# Patient Record
Sex: FEMALE | Race: WHITE | HISPANIC OR LATINO | Employment: FULL TIME | URBAN - METROPOLITAN AREA
[De-identification: names, ages, dates, MRNs, and addresses within clinical notes are randomized per-mention and may not be internally consistent; named-entity substitution may affect disease eponyms.]

---

## 2017-02-24 ENCOUNTER — HOSPITAL ENCOUNTER (OUTPATIENT)
Dept: LAB | Facility: MEDICAL CENTER | Age: 42
End: 2017-02-24
Attending: NURSE PRACTITIONER
Payer: COMMERCIAL

## 2017-02-24 ENCOUNTER — OFFICE VISIT (OUTPATIENT)
Dept: MEDICAL GROUP | Facility: PHYSICIAN GROUP | Age: 42
End: 2017-02-24
Payer: COMMERCIAL

## 2017-02-24 VITALS
HEART RATE: 70 BPM | DIASTOLIC BLOOD PRESSURE: 78 MMHG | WEIGHT: 153 LBS | HEIGHT: 62 IN | TEMPERATURE: 97.2 F | OXYGEN SATURATION: 98 % | RESPIRATION RATE: 14 BRPM | BODY MASS INDEX: 28.16 KG/M2 | SYSTOLIC BLOOD PRESSURE: 102 MMHG

## 2017-02-24 DIAGNOSIS — Z00.00 PREVENTATIVE HEALTH CARE: ICD-10-CM

## 2017-02-24 DIAGNOSIS — Z12.39 SCREENING FOR BREAST CANCER: ICD-10-CM

## 2017-02-24 DIAGNOSIS — E07.89 THYROID FULLNESS: ICD-10-CM

## 2017-02-24 LAB
25(OH)D3 SERPL-MCNC: 10 NG/ML (ref 30–100)
ALBUMIN SERPL BCP-MCNC: 3.7 G/DL (ref 3.2–4.9)
ALBUMIN/GLOB SERPL: 1 G/DL
ALP SERPL-CCNC: 61 U/L (ref 30–99)
ALT SERPL-CCNC: 45 U/L (ref 2–50)
ANION GAP SERPL CALC-SCNC: 5 MMOL/L (ref 0–11.9)
AST SERPL-CCNC: 39 U/L (ref 12–45)
BILIRUB SERPL-MCNC: 0.4 MG/DL (ref 0.1–1.5)
BUN SERPL-MCNC: 14 MG/DL (ref 8–22)
CALCIUM SERPL-MCNC: 9.1 MG/DL (ref 8.5–10.5)
CHLORIDE SERPL-SCNC: 108 MMOL/L (ref 96–112)
CHOLEST SERPL-MCNC: 109 MG/DL (ref 100–199)
CO2 SERPL-SCNC: 26 MMOL/L (ref 20–33)
CREAT SERPL-MCNC: 0.64 MG/DL (ref 0.5–1.4)
GLOBULIN SER CALC-MCNC: 3.7 G/DL (ref 1.9–3.5)
GLUCOSE SERPL-MCNC: 101 MG/DL (ref 65–99)
HDLC SERPL-MCNC: 36 MG/DL
LDLC SERPL CALC-MCNC: 58 MG/DL
POTASSIUM SERPL-SCNC: 4.2 MMOL/L (ref 3.6–5.5)
PROT SERPL-MCNC: 7.4 G/DL (ref 6–8.2)
SODIUM SERPL-SCNC: 139 MMOL/L (ref 135–145)
TRIGL SERPL-MCNC: 74 MG/DL (ref 0–149)
TSH SERPL DL<=0.005 MIU/L-ACNC: 2.04 UIU/ML (ref 0.3–3.7)

## 2017-02-24 PROCEDURE — 82306 VITAMIN D 25 HYDROXY: CPT

## 2017-02-24 PROCEDURE — 84443 ASSAY THYROID STIM HORMONE: CPT

## 2017-02-24 PROCEDURE — 80061 LIPID PANEL: CPT

## 2017-02-24 PROCEDURE — 99214 OFFICE O/P EST MOD 30 MIN: CPT | Performed by: NURSE PRACTITIONER

## 2017-02-24 PROCEDURE — 36415 COLL VENOUS BLD VENIPUNCTURE: CPT

## 2017-02-24 PROCEDURE — 80053 COMPREHEN METABOLIC PANEL: CPT

## 2017-02-24 ASSESSMENT — PATIENT HEALTH QUESTIONNAIRE - PHQ9: CLINICAL INTERPRETATION OF PHQ2 SCORE: 0

## 2017-02-24 NOTE — MR AVS SNAPSHOT
"        Alis Perdomo   2017 7:20 AM   Office Visit   MRN: [id number]    Department:  [facility]   Dept Phone:  [phone]    Description:  Female : 1975   Provider:  LESLIE Desai           Reason for Visit     Establish Care     Lump neck area      Allergies as of 2017     No Known Allergies      You were diagnosed with     Screening for breast cancer   [359116]       Thyroid fullness   [534112]       Preventative health care   [[id number]]         Vital Signs     Blood Pressure Pulse Temperature Respirations Height Weight    102/78 mmHg 70 36.2 °C (97.2 °F) 14 1.575 m (5' 2.01\") 69.4 kg (153 lb)    Body Mass Index Oxygen Saturation Last Menstrual Period Breastfeeding? Smoking Status       27.98 kg/m2 98% 2017 No Never Smoker        Basic Information     Date Of Birth Sex Race Ethnicity Preferred Language    1975 Female  or   Origin (Mauritanian,Citizen of [place], etc) English      Problem List              ICD-10-CM Priority Class Noted - Resolved    Thyroid fullness E07.89   2017 - Present      Health Maintenance        Date Due Completion Dates    IMM DTaP/Tdap/Td Vaccine (1 - Tdap) 1993 ---    MAMMOGRAM 2016    IMM INFLUENZA (1) 2016 ---    PAP SMEAR 7/15/2018 7/15/2015, 2013 (Done)    Override on 2013: Done            Current Immunizations     No immunizations on file.      Below and/or attached are the medications your provider expects you to take. Review all of your home medications and newly ordered medications with your provider and/or pharmacist. Follow medication instructions as directed by your provider and/or pharmacist. Please keep your medication list with you and share with your provider. Update the information when medications are discontinued, doses are changed, or new medications (including over-the-counter products) are added; and carry medication information at all times in " the event of emergency situations     Allergies:  No Known Allergies          Medications  Valid as of: February 24, 2017 -  7:56 AM    Generic Name Brand Name Tablet Size Instructions for use    .                 Medicines prescribed today were sent to:     [facility] [address] [place] [address] [address] [facility]    [address] [facility] [address]    Phone: [phone] Fax: [phone]    Open 24 Hours?: No      Medication refill instructions:       If your prescription bottle indicates you have medication refills left, it is not necessary to call your provider’s office. Please contact your pharmacy and they will refill your medication.    If your prescription bottle indicates you do not have any refills left, you may request refills at any time through one of the following ways: The online [employer] system (except Urgent Care), by calling your provider’s office, or by asking your pharmacy to contact your provider’s office with a refill request. Medication refills are processed only during regular business hours and may not be available until the next business day. Your provider may request additional information or to have a follow-up visit with you prior to refilling your medication.   *Please Note: Medication refills are assigned a new Rx number when refilled electronically. Your pharmacy may indicate that no refills were authorized even though a new prescription for the same medication is available at the pharmacy. Please request the medicine by name with the pharmacy before contacting your provider for a refill.        Your To Do List     Future Labs/Procedures Complete By Expires    COMP METABOLIC PANEL  As directed 2/25/2018    LIPID PROFILE  As directed 2/25/2018    MA-SCREEN MAMMO W/CAD-BILAT  As directed 3/28/2018    TSH WITH REFLEX TO FT4  As directed 2/24/2018    US-SOFT TISSUES OF HEAD - NECK  As directed 8/27/2017    VITAMIN D,25 HYDROXY  As directed 2/25/2018         [employer] Access Code:  [email or web address]  Expires: 3/2/2017 12:09 PM    [employer]  A secure, online tool to manage your health information     [employer] Home[employer]® is a secure, online tool that connects you to your personalized health information from the privacy of your home -- day or night - making it very easy for you to manage your healthcare. Once the activation process is completed, you can even access your medical information using the Home[employer] Home[employer] shabbir, which is available for free in the Apple Shabbir store or Google Play store.     [employer] provides the following levels of access (as shown below):   My Chart Features   [facility] Primary Care Doctor [facility]  Specialists [facility]  Urgent  Care Non-[facility]  Primary Care  Doctor   Email your healthcare team securely and privately 24/7 X X X    Manage appointments: schedule your next appointment; view details of past/upcoming appointments X      Request prescription refills. X      View recent personal medical records, including lab and immunizations X X X X   View health record, including health history, allergies, medications X X X X   Read reports about your outpatient visits, procedures, consult and ER notes X X X X   See your discharge summary, which is a recap of your hospital and/or ER visit that includes your diagnosis, lab results, and care plan. X X       How to register for [employer]:  1. Go to  [email or web address].  2. Click on the Sign Up Now box, which takes you to the New Member Sign Up page. You will need to provide the following information:  a. Enter your [employer] Access Code exactly as it appears at the top of this page. (You will not need to use this code after you’ve completed the sign-up process. If you do not sign up before the expiration date, you must request a new code.)   b. Enter your date of birth.   c. Enter your home email address.   d. Click Submit, and follow the next screen’s instructions.  3. Create a Home[employer] Home[employer] ID. This will be your Home[employer] Home[employer] login ID and cannot be  changed, so think of one that is secure and easy to remember.  4. Create a [employer] password. You can change your password at any time.  5. Enter your Password Reset Question and Answer. This can be used at a later time if you forget your password.   6. Enter your e-mail address. This allows you to receive e-mail notifications when new information is available in [employer].  7. Click Sign Up. You can now view your health information.    For assistance activating your [employer] account, call [phone]

## 2017-02-24 NOTE — ASSESSMENT & PLAN NOTE
Patient states that she has noticed a fullness in her right neck.  No pain, some difficulty swallowing.  No fever, chills, recent sore throat, cold.

## 2017-02-24 NOTE — PROGRESS NOTES
"Chief Complaint   Patient presents with   • Establish Care   • Lump     neck area       Subjective:   Alis Perdomo is a 41 y.o. female here today to establish care and for evaluation and management of:    Thyroid fullness  Patient states that she has noticed a fullness in her right neck.  No pain, some difficulty swallowing.  No fever, chills, recent sore throat, cold.         Current medicines (including changes today)  No current outpatient prescriptions on file.     No current facility-administered medications for this visit.     She  has no past medical history of Asthma, Hypertension, or Hyperlipidemia.    ROS + thyroid fullness -fever, chills, sorethroat.  No chest pain, no shortness of breath, no abdominal pain       Objective:     Blood pressure 102/78, pulse 70, temperature 36.2 °C (97.2 °F), resp. rate 14, height 1.575 m (5' 2.01\"), weight 69.4 kg (153 lb), last menstrual period 02/19/2017, SpO2 98 %, not currently breastfeeding. Body mass index is 27.98 kg/(m^2).   Physical Exam:  Constitutional: Alert, no distress.  Skin: Warm, dry, good turgor,no cyanosis, no rashes in visible areas.  Eye: Equal, round and reactive, conjunctiva clear, lids normal.  Ears: No tenderness, no discharge.  External canals are without any significant edema or erythema.  Tympanic membranes are without any inflammation, no effusion.  Gross auditory acuity is intact.  Nose: symmetrical without tenderness, no discharge.  Mouth/Throat: lips without lesion.  Oropharynx clear.  Throat without erythema, exudates or tonsillar enlargement.  Neck: Trachea midline,right fullness noted on thyroid.  No nodules palpable. No cervical or supraclavicular lymphadenopathy. Range of motion within normal limits.  Neuro: Cranial nerves 2-12 grossly intact.  No sensory deficit.  Respiratory: Unlabored respiratory effort, lungs clear to auscultation, no wheezes, no ronchi.  Cardiovascular: Normal S1, S2, no murmur, no " edema.  Abdomen: Soft, non-tender, no masses, no guarding,  no hepatosplenomegaly.  Psych: Alert and oriented x3, normal affect and mood and judgement.        Assessment and Plan:   The following treatment plan was discussed    1. Thyroid fullness  This is a new problem to me.  Acute.  Will check TSH and thyroid ultrasound.  Monitor and follow results.  - COMP METABOLIC PANEL; Future  - LIPID PROFILE; Future  - TSH WITH REFLEX TO FT4; Future  - US-SOFT TISSUES OF HEAD - NECK; Future    2. Preventative health care  Discussed diet, exercise and adding calcium to her daily routine.  Will check vitamin D levels.    - VITAMIN D,25 HYDROXY; Future    3. Screening for breast cancer  Overdue for screening.  No breast changes reported at this time.  Past mammograms have been normal.  Order placed.  Will monitor results.  - MA-SCREEN MAMMO W/CAD-BILAT; Future      Followup: Return in about 2 months (around 4/24/2017), or if symptoms worsen or fail to improve, for well woman.

## 2017-02-27 ENCOUNTER — TELEPHONE (OUTPATIENT)
Dept: MEDICAL GROUP | Facility: PHYSICIAN GROUP | Age: 42
End: 2017-02-27

## 2017-02-27 NOTE — TELEPHONE ENCOUNTER
"----- Message from LESLIE Desai sent at 2/27/2017  8:00 AM PST -----  Please notify patient, her vitamin D level came back low at 10.  The normal level is above 30.  Vitamin D is an important vitamin that helps with energy, bone strength and our immune system.  It also helps our body utilize glucose more efficiently.  I would like her to add 5000 units of vitamin D3 daily.  We will recheck your levels in 12 months.    All other lab results fell within the normal range, except the good cholesterol was just a little low.  Increasing cardiovascular exercise like brisk walking 20 minutes/day can help bring that number up.  The \"good\" cholesterol helps protect our heart from disease.  Please call with any questions.  ANGELICA Desai.  "

## 2017-07-06 ENCOUNTER — HOSPITAL ENCOUNTER (OUTPATIENT)
Dept: RADIOLOGY | Facility: MEDICAL CENTER | Age: 42
End: 2017-07-06
Attending: NURSE PRACTITIONER
Payer: COMMERCIAL

## 2017-07-06 DIAGNOSIS — E07.89 THYROID FULLNESS: ICD-10-CM

## 2017-07-06 PROCEDURE — 76536 US EXAM OF HEAD AND NECK: CPT

## 2017-07-10 ENCOUNTER — TELEPHONE (OUTPATIENT)
Dept: MEDICAL GROUP | Facility: PHYSICIAN GROUP | Age: 42
End: 2017-07-10

## 2017-07-10 NOTE — TELEPHONE ENCOUNTER
----- Message from LESLIE Desai sent at 7/10/2017  7:34 AM PDT -----  Please let Ratna know that her thyroid ultrasound did not show any abnormalities or nodules.  All good news.  LESLIE Desai

## 2017-09-20 ENCOUNTER — OFFICE VISIT (OUTPATIENT)
Dept: MEDICAL GROUP | Facility: PHYSICIAN GROUP | Age: 42
End: 2017-09-20
Payer: COMMERCIAL

## 2017-09-20 VITALS
WEIGHT: 155 LBS | TEMPERATURE: 98.1 F | OXYGEN SATURATION: 100 % | HEART RATE: 79 BPM | RESPIRATION RATE: 12 BRPM | DIASTOLIC BLOOD PRESSURE: 76 MMHG | HEIGHT: 62 IN | SYSTOLIC BLOOD PRESSURE: 118 MMHG | BODY MASS INDEX: 28.52 KG/M2

## 2017-09-20 DIAGNOSIS — R22.1 THROAT SWELLING: ICD-10-CM

## 2017-09-20 PROCEDURE — 99213 OFFICE O/P EST LOW 20 MIN: CPT | Performed by: NURSE PRACTITIONER

## 2017-09-20 RX ORDER — OMEPRAZOLE 20 MG/1
20 CAPSULE, DELAYED RELEASE ORAL DAILY
Qty: 21 CAP | Refills: 0 | Status: SHIPPED | OUTPATIENT
Start: 2017-09-20 | End: 2018-12-10

## 2017-09-21 NOTE — PROGRESS NOTES
Subjective:     Chief Complaint   Patient presents with   • Oral Swelling     throat/neck swelling       HPI  Alis Perdomo is a 42 y.o. female here today for f/u on ongoing throat symptoms. She is a patient of ANDREA Desai    Throat swelling  Ongoing problem. Initially started this past year.   Worse over past 3 weeks.   Feels like there is swelling in middle of throat, described as deep inside.   Turning head to right increases symptoms.   No dysphagia. No globus sensation.   She does feel like she has more heartburn, and some burning sensation in throat beginning the past 3 weeks as the swelling has gotten worse. Sore throat has always been present. Hoarseness present.    Has hx of allergies persistently, but this was started long before these symptoms.   No F/C, fatigue, or weight loss.   No treatments tried.     US normal July 2017 7/6/2017 4:19 PM    HISTORY/REASON FOR EXAM:  Thyroid fullness      TECHNIQUE/EXAM DESCRIPTION:  Ultrasound of the soft tissues of the head and neck.    COMPARISON:  None    FINDINGS:  The thyroid gland is heterogeneous.  Vascularity is normal.    The right lobe of the thyroid gland measures 1.78 cm x 4.57 cm x 1.96 cm.  The left lobe of the thyroid gland measures 1.17 cm x 4.26 cm x 1.72 cm.  The isthmus measures 0.15 cm.    No discrete nodules or cervical mass is identified.   Impression       No abnormalities identified.         *The encounter diagnosis was Throat swelling.    Allergies: Review of patient's allergies indicates no known allergies.  Current medicines (including changes today)  Current Outpatient Prescriptions   Medication Sig Dispense Refill   • omeprazole (PRILOSEC) 20 MG delayed-release capsule Take 1 Cap by mouth every day. On empty stomach 21 Cap 0     No current facility-administered medications for this visit.        She  has no past medical history of Asthma; Hyperlipidemia; or Hypertension.      ROS  As stated in HPI       "Objective:     Blood pressure 118/76, pulse 79, temperature 36.7 °C (98.1 °F), resp. rate 12, height 1.575 m (5' 2\"), weight 70.3 kg (155 lb), SpO2 100 %. Body mass index is 28.35 kg/m².  Physical Exam:  General: Alert, oriented, in no acute distress.  Eye contact is good, speech goal directed, affect calm  CNs grossly intact.  HEENT: conjunctiva non-injected, sclera non-icteric, EOMs intact. No lid edema or eye drainage.   Gross hearing intact.  Nasal turbinates without edema or drainage.   Oral mucous membranes pink and moist with no lesions. Oropharynx with mild erythema. No exudate.   Neck: Supple. No adenopathy or masses in the cervical or supraclavicular regions. No thyromegaly  Skin: No rashes or lesions in visible areas  Gait steady.     Assessment and Plan:   Assessment/Plan:  1. Throat swelling  Ongoing chronic problem. Worsening. US soft tissues& thyroid recently normal. No red flag symptoms present. Suspect irritation r/t either reflux or PND. Start trial of omeprazole 20 mg daily on empty stomach for 3 weeks. If no improvement, trial of Zyrtec daily at bedtime. She will call if neither improves symptoms and we can refer to ENT at this time.   - omeprazole (PRILOSEC) 20 MG delayed-release capsule; Take 1 Cap by mouth every day. On empty stomach  Dispense: 21 Cap; Refill: 0       Follow up:  Return in about 6 weeks (around 11/1/2017).    Educated in proper administration of medication(s) ordered today including safety, possible SE, risks, benefits, rationale and alternatives to therapy.   Supportive care, differential diagnoses, and indications for immediate follow-up discussed with patient.    Pathogenesis of diagnosis discussed including typical length and natural progression.    Instructed to return to clinic or nearest emergency department for any change in condition, further concerns, or worsening of symptoms.  Patient states understanding of the plan of care and discharge instructions.      Please " note that this dictation was created using voice recognition software. I have made every reasonable attempt to correct obvious errors, but I expect that there are errors of grammar and possibly content that I did not discover before finalizing the note.    Followup: Return in about 6 weeks (around 11/1/2017). sooner should new symptoms or problems arise.

## 2017-09-21 NOTE — ASSESSMENT & PLAN NOTE
Ongoing problem. Initially started this past year.   Worse over past 3 weeks.   Feels like there is swelling in middle of throat, described as deep inside.   Turning head to right increases symptoms.   No dysphagia. No globus sensation.   She does feel like she has more heartburn, and some burning sensation in throat beginning the past 3 weeks as the swelling has gotten worse. Sore throat has always been present. Hoarseness present.    Has hx of allergies persistently, but this was started long before these symptoms.   No F/C, fatigue, or weight loss.   No treatments tried.     US normal July 2017 7/6/2017 4:19 PM    HISTORY/REASON FOR EXAM:  Thyroid fullness      TECHNIQUE/EXAM DESCRIPTION:  Ultrasound of the soft tissues of the head and neck.    COMPARISON:  None    FINDINGS:  The thyroid gland is heterogeneous.  Vascularity is normal.    The right lobe of the thyroid gland measures 1.78 cm x 4.57 cm x 1.96 cm.  The left lobe of the thyroid gland measures 1.17 cm x 4.26 cm x 1.72 cm.  The isthmus measures 0.15 cm.    No discrete nodules or cervical mass is identified.   Impression       No abnormalities identified.

## 2018-04-23 ENCOUNTER — OFFICE VISIT (OUTPATIENT)
Dept: URGENT CARE | Facility: PHYSICIAN GROUP | Age: 43
End: 2018-04-23
Payer: COMMERCIAL

## 2018-04-23 ENCOUNTER — HOSPITAL ENCOUNTER (OUTPATIENT)
Facility: MEDICAL CENTER | Age: 43
End: 2018-04-23
Attending: NURSE PRACTITIONER
Payer: COMMERCIAL

## 2018-04-23 VITALS
HEIGHT: 62 IN | HEART RATE: 102 BPM | DIASTOLIC BLOOD PRESSURE: 62 MMHG | SYSTOLIC BLOOD PRESSURE: 112 MMHG | BODY MASS INDEX: 28.52 KG/M2 | WEIGHT: 155 LBS | TEMPERATURE: 97.6 F | OXYGEN SATURATION: 100 %

## 2018-04-23 DIAGNOSIS — R35.0 URINARY FREQUENCY: ICD-10-CM

## 2018-04-23 DIAGNOSIS — N39.0 URINARY TRACT INFECTION WITH HEMATURIA, SITE UNSPECIFIED: ICD-10-CM

## 2018-04-23 DIAGNOSIS — R31.9 URINARY TRACT INFECTION WITH HEMATURIA, SITE UNSPECIFIED: ICD-10-CM

## 2018-04-23 PROCEDURE — 87086 URINE CULTURE/COLONY COUNT: CPT

## 2018-04-23 PROCEDURE — 99214 OFFICE O/P EST MOD 30 MIN: CPT | Performed by: NURSE PRACTITIONER

## 2018-04-23 RX ORDER — NITROFURANTOIN 25; 75 MG/1; MG/1
100 CAPSULE ORAL EVERY 12 HOURS
Qty: 10 CAP | Refills: 0 | Status: SHIPPED | OUTPATIENT
Start: 2018-04-23 | End: 2018-04-28

## 2018-04-23 ASSESSMENT — ENCOUNTER SYMPTOMS
DIARRHEA: 0
BACK PAIN: 1
DIZZINESS: 0
ORTHOPNEA: 0
CHILLS: 0
FLANK PAIN: 1
VOMITING: 0
HEADACHES: 0
NAUSEA: 0
FEVER: 0
ABDOMINAL PAIN: 1

## 2018-04-24 DIAGNOSIS — R31.9 URINARY TRACT INFECTION WITH HEMATURIA, SITE UNSPECIFIED: ICD-10-CM

## 2018-04-24 DIAGNOSIS — N39.0 URINARY TRACT INFECTION WITH HEMATURIA, SITE UNSPECIFIED: ICD-10-CM

## 2018-04-24 NOTE — PROGRESS NOTES
"Subjective:      Alis Perdomo is a 42 y.o. female who presents with Flank Pain (x 1 week frequent urination )            HPI New problem. 42 year old female with urinary frequency and flank pain x one week. Over the past couple of days she also has had lower abdominal bloating and discomfort. Denies fever, chills, myalgia, nausea or diarrhea. She has not taken any medication for these symptoms.  Patient has no known allergies.  Current Outpatient Prescriptions on File Prior to Visit   Medication Sig Dispense Refill   • omeprazole (PRILOSEC) 20 MG delayed-release capsule Take 1 Cap by mouth every day. On empty stomach 21 Cap 0     No current facility-administered medications on file prior to visit.      Social History     Social History   • Marital status:      Spouse name: N/A   • Number of children: N/A   • Years of education: N/A     Occupational History   • Not on file.     Social History Main Topics   • Smoking status: Never Smoker   • Smokeless tobacco: Never Used   • Alcohol use No   • Drug use: No   • Sexual activity: Yes     Birth control/ protection: IUD     Other Topics Concern   • Not on file     Social History Narrative   • No narrative on file     family history includes Arthritis in her mother; Diabetes in her mother; Heart Disease in her mother; Hypertension in her mother; No Known Problems in her father; Thyroid in her mother and sister.      Review of Systems   Constitutional: Negative for chills and fever.   Cardiovascular: Negative for chest pain and orthopnea.   Gastrointestinal: Positive for abdominal pain. Negative for diarrhea, nausea and vomiting.   Genitourinary: Positive for dysuria, flank pain and frequency. Negative for hematuria and urgency.   Musculoskeletal: Positive for back pain.   Neurological: Negative for dizziness and headaches.          Objective:     /62   Pulse (!) 102   Temp 36.4 °C (97.6 °F)   Ht 1.575 m (5' 2\")   Wt 70.3 kg (155 lb)   " SpO2 100%   BMI 28.35 kg/m²      Physical Exam   Constitutional: She is oriented to person, place, and time. She appears well-developed and well-nourished. No distress.   Cardiovascular: Normal rate, regular rhythm and normal heart sounds.    No murmur heard.  Pulmonary/Chest: Effort normal and breath sounds normal. No respiratory distress.   Abdominal: Soft. Bowel sounds are normal. There is tenderness in the suprapubic area. There is no CVA tenderness.   Musculoskeletal: Normal range of motion.   Moves all 4 extremities normally   Neurological: She is alert and oriented to person, place, and time.   Skin: Skin is warm and dry.   Psychiatric: She has a normal mood and affect. Her behavior is normal. Thought content normal.   Nursing note and vitals reviewed.              Assessment/Plan:     1. Urinary frequency     2. Urinary tract infection with hematuria, site unspecified  POCT Urinalysis    Urine Culture    nitrofurantoin monohydr macro (MACROBID) 100 MG Cap     Differential diagnosis, natural history, supportive care, and indications for immediate follow-up discussed at length.

## 2018-04-26 LAB
BACTERIA UR CULT: NORMAL
SIGNIFICANT IND 70042: NORMAL
SITE SITE: NORMAL
SOURCE SOURCE: NORMAL

## 2018-06-06 ENCOUNTER — OFFICE VISIT (OUTPATIENT)
Dept: URGENT CARE | Facility: PHYSICIAN GROUP | Age: 43
End: 2018-06-06
Payer: COMMERCIAL

## 2018-06-06 VITALS
HEART RATE: 90 BPM | SYSTOLIC BLOOD PRESSURE: 96 MMHG | OXYGEN SATURATION: 100 % | BODY MASS INDEX: 27.6 KG/M2 | RESPIRATION RATE: 15 BRPM | TEMPERATURE: 98.9 F | HEIGHT: 62 IN | DIASTOLIC BLOOD PRESSURE: 62 MMHG | WEIGHT: 150 LBS

## 2018-06-06 DIAGNOSIS — J20.9 ACUTE WHEEZY BRONCHITIS: ICD-10-CM

## 2018-06-06 DIAGNOSIS — R35.0 URINARY FREQUENCY: ICD-10-CM

## 2018-06-06 LAB
APPEARANCE UR: NORMAL
BILIRUB UR STRIP-MCNC: NORMAL MG/DL
COLOR UR AUTO: YELLOW
GLUCOSE UR STRIP.AUTO-MCNC: NORMAL MG/DL
KETONES UR STRIP.AUTO-MCNC: NORMAL MG/DL
LEUKOCYTE ESTERASE UR QL STRIP.AUTO: NORMAL
NITRITE UR QL STRIP.AUTO: NORMAL
PH UR STRIP.AUTO: 7 [PH] (ref 5–8)
PROT UR QL STRIP: NORMAL MG/DL
RBC UR QL AUTO: NORMAL
SP GR UR STRIP.AUTO: 1.01
UROBILINOGEN UR STRIP-MCNC: NORMAL MG/DL

## 2018-06-06 PROCEDURE — 81002 URINALYSIS NONAUTO W/O SCOPE: CPT | Performed by: FAMILY MEDICINE

## 2018-06-06 PROCEDURE — 99214 OFFICE O/P EST MOD 30 MIN: CPT | Performed by: FAMILY MEDICINE

## 2018-06-06 RX ORDER — PHENAZOPYRIDINE HYDROCHLORIDE 200 MG/1
200 TABLET, FILM COATED ORAL 3 TIMES DAILY PRN
Qty: 6 TAB | Refills: 0 | Status: SHIPPED | OUTPATIENT
Start: 2018-06-06 | End: 2018-12-10

## 2018-06-06 RX ORDER — PREDNISONE 20 MG/1
40 TABLET ORAL EVERY MORNING
Qty: 12 TAB | Refills: 0 | Status: SHIPPED | OUTPATIENT
Start: 2018-06-06 | End: 2018-06-12

## 2018-06-06 RX ORDER — ALBUTEROL SULFATE 90 UG/1
2 AEROSOL, METERED RESPIRATORY (INHALATION) EVERY 6 HOURS PRN
Qty: 8.5 G | Refills: 3 | Status: SHIPPED | OUTPATIENT
Start: 2018-06-06 | End: 2018-06-14

## 2018-06-06 RX ORDER — CEFDINIR 300 MG/1
300 CAPSULE ORAL EVERY 12 HOURS
Qty: 14 CAP | Refills: 0 | Status: SHIPPED | OUTPATIENT
Start: 2018-06-06 | End: 2018-06-13

## 2018-06-06 ASSESSMENT — ENCOUNTER SYMPTOMS
FEVER: 0
COUGH: 1
FOCAL WEAKNESS: 0
DIZZINESS: 0
WHEEZING: 1
CHILLS: 0
SPUTUM PRODUCTION: 0
SINUS PAIN: 1

## 2018-06-07 NOTE — PROGRESS NOTES
"Subjective:      Alis Perdomo is a 42 y.o. female who presents with Cough (wheeze, SOB. x 2 weeks. Urinary frequency and discomfort. )    Chief Complaint   Patient presents with   • Cough     wheeze, SOB. x 2 weeks. Urinary frequency and discomfort.         - This is a very pleasant 42 y.o. female with complaints of dry cough and hard to take a deep breath at times for past 2 wks, no NVFC/CP. Some urinary freq/burn x 1-2 days           ALLERGIES:  Patient has no known allergies.     PMH:  No past medical history on file.     MEDS:    Current Outpatient Prescriptions:   •  cefdinir (OMNICEF) 300 MG Cap, Take 1 Cap by mouth every 12 hours for 7 days., Disp: 14 Cap, Rfl: 0  •  predniSONE (DELTASONE) 20 MG Tab, Take 2 Tabs by mouth every morning for 6 days., Disp: 12 Tab, Rfl: 0  •  albuterol 108 (90 Base) MCG/ACT Aero Soln inhalation aerosol, Inhale 2 Puffs by mouth every 6 hours as needed for Shortness of Breath., Disp: 8.5 g, Rfl: 3  •  phenazopyridine (PYRIDIUM) 200 MG Tab, Take 1 Tab by mouth 3 times a day as needed., Disp: 6 Tab, Rfl: 0  •  omeprazole (PRILOSEC) 20 MG delayed-release capsule, Take 1 Cap by mouth every day. On empty stomach, Disp: 21 Cap, Rfl: 0    ** I have documented what I find to be significant in regards to past medical, social, family and surgical history  in my HPI or under PMH/PSH/FH review section, otherwise it is contributory **           HPI    Review of Systems   Constitutional: Negative for chills and fever.   HENT: Positive for congestion and sinus pain.    Respiratory: Positive for cough and wheezing. Negative for sputum production.    Genitourinary: Positive for dysuria and frequency.   Neurological: Negative for dizziness and focal weakness.          Objective:     BP (!) 96/62   Pulse 90   Temp 37.2 °C (98.9 °F)   Resp 15   Ht 1.575 m (5' 2\")   Wt 68 kg (150 lb)   SpO2 100%   BMI 27.44 kg/m²      Physical Exam   Constitutional: She appears well-developed. " No distress.   HENT:   Head: Normocephalic and atraumatic.   Mouth/Throat: Oropharynx is clear and moist.   Eyes: Conjunctivae are normal.   Neck: Neck supple.   Cardiovascular: Regular rhythm.    No murmur heard.  Pulmonary/Chest: Effort normal and breath sounds normal. No respiratory distress.   Abdominal: Soft. There is no tenderness.   Neurological: She is alert. She exhibits normal muscle tone.   Skin: Skin is warm and dry.   Psychiatric: She has a normal mood and affect. Judgment normal.   Nursing note and vitals reviewed.              Assessment/Plan:         1. Acute wheezy bronchitis  cefdinir (OMNICEF) 300 MG Cap    predniSONE (DELTASONE) 20 MG Tab    albuterol 108 (90 Base) MCG/ACT Aero Soln inhalation aerosol   2. Urinary frequency  POCT Urinalysis    cefdinir (OMNICEF) 300 MG Cap    phenazopyridine (PYRIDIUM) 200 MG Tab             Dx & d/c instructions discussed w/ patient and/or family members. Follow up w/ Prvt Dr or here in 3-4 days if not getting better, sooner if needed,  ER if worse and UC/PCP unavailable.        Possible side effects (i.e. Rash, GI upset/constipation, sedation, elevation of BP or sugars) of any medications given discussed.

## 2018-06-08 ENCOUNTER — TELEPHONE (OUTPATIENT)
Dept: URGENT CARE | Facility: CLINIC | Age: 43
End: 2018-06-08

## 2018-06-08 DIAGNOSIS — R06.2 WHEEZING: ICD-10-CM

## 2018-06-09 NOTE — TELEPHONE ENCOUNTER
Pharmacy is requesting a PA for Proventil HFA or to be switched to different inhaler. Ph [phone]

## 2018-06-12 RX ORDER — ALBUTEROL SULFATE 90 UG/1
2 AEROSOL, METERED RESPIRATORY (INHALATION) EVERY 6 HOURS PRN
Qty: 8.5 G | Refills: 3 | Status: SHIPPED | OUTPATIENT
Start: 2018-06-12 | End: 2018-06-14

## 2018-06-14 RX ORDER — ALBUTEROL SULFATE 90 UG/1
2 AEROSOL, METERED RESPIRATORY (INHALATION) EVERY 6 HOURS PRN
Qty: 8.5 G | Refills: 3 | Status: SHIPPED | OUTPATIENT
Start: 2018-06-14 | End: 2018-12-10

## 2018-06-16 ENCOUNTER — OFFICE VISIT (OUTPATIENT)
Dept: URGENT CARE | Facility: PHYSICIAN GROUP | Age: 43
End: 2018-06-16
Payer: COMMERCIAL

## 2018-06-16 VITALS
TEMPERATURE: 97.8 F | BODY MASS INDEX: 27.6 KG/M2 | WEIGHT: 150 LBS | HEIGHT: 62 IN | DIASTOLIC BLOOD PRESSURE: 60 MMHG | OXYGEN SATURATION: 100 % | SYSTOLIC BLOOD PRESSURE: 100 MMHG | RESPIRATION RATE: 14 BRPM | HEART RATE: 99 BPM

## 2018-06-16 DIAGNOSIS — N10 ACUTE PYELONEPHRITIS: ICD-10-CM

## 2018-06-16 PROCEDURE — 99214 OFFICE O/P EST MOD 30 MIN: CPT | Performed by: FAMILY MEDICINE

## 2018-06-16 RX ORDER — CIPROFLOXACIN 500 MG/1
500 TABLET, FILM COATED ORAL EVERY 12 HOURS
Qty: 14 TAB | Refills: 0 | Status: SHIPPED | OUTPATIENT
Start: 2018-06-16 | End: 2018-06-23

## 2018-06-16 NOTE — PROGRESS NOTES
Subjective:      Chief Complaint   Patient presents with   • Back Pain     back pain poss uti nausea                Dysuria   This is a new problem. The current episode started in the past 7 days. The problem occurs every urination. The problem has been unchanged. The quality of the pain is described as burning. There has been no fever. Pt is  sexually active. There is no history of pyelonephritis. Associated symptoms include frequency and urgency and back pain. Pertinent negatives include no chills, discharge, flank pain, nausea or vomiting. Pt has tried nothing for the symptoms. There is no history of recurrent UTIs.     Social History     Social History   • Marital status:      Spouse name: N/A   • Number of children: N/A   • Years of education: N/A     Occupational History   • Not on file.     Social History Main Topics   • Smoking status: Never Smoker   • Smokeless tobacco: Never Used   • Alcohol use No   • Drug use: No   • Sexual activity: Yes     Birth control/ protection: IUD     Other Topics Concern   • Not on file     Social History Narrative   • No narrative on file         Family History   Problem Relation Age of Onset   • Diabetes Mother    • Heart Disease Mother    • Hypertension Mother    • Arthritis Mother      lupus   • Thyroid Mother    • No Known Problems Father    • Thyroid Sister          No Known Allergies        Current Outpatient Prescriptions on File Prior to Visit   Medication Sig Dispense Refill   • albuterol (PROAIR HFA) 108 (90 Base) MCG/ACT Aero Soln inhalation aerosol Inhale 2 Puffs by mouth every 6 hours as needed for Shortness of Breath. 8.5 g 3   • phenazopyridine (PYRIDIUM) 200 MG Tab Take 1 Tab by mouth 3 times a day as needed. 6 Tab 0   • omeprazole (PRILOSEC) 20 MG delayed-release capsule Take 1 Cap by mouth every day. On empty stomach 21 Cap 0     No current facility-administered medications on file prior to visit.        Review of Systems   Constitutional: Negative for  "chills.   Respiratory: Negative for shortness of breath.    Cardiovascular: Negative for chest pain.   Gastrointestinal: Negative for nausea, vomiting and abdominal pain.   Genitourinary: Positive for dysuria, urgency and frequency. Negative for flank pain.   Skin: Negative for rash.   Neurological: Negative for dizziness and headaches.   All other systems reviewed and are negative.         Objective:      Blood pressure 100/60, pulse 99, temperature 36.6 °C (97.8 °F), resp. rate 14, height 1.575 m (5' 2\"), weight 68 kg (150 lb), SpO2 100 %.      Physical Exam   Constitutional: pt is oriented to person, place, and time. Pt appears well-developed and well-nourished. No distress.   HENT:   Head: Normocephalic and atraumatic.   Mouth/Throat: Mucous membranes are normal.   Eyes: Conjunctivae and EOM are normal. Pupils are equal, round, and reactive to light. Right eye exhibits no discharge. Left eye exhibits no discharge. No scleral icterus.   Neck: Normal range of motion. Neck supple.   Cardiovascular: Normal rate, regular rhythm, normal heart sounds and intact distal pulses.    No murmur heard.  Pulmonary/Chest: Effort normal and breath sounds normal. No respiratory distress. Pt has no wheezes,  rales.   Abdominal: Bowel sounds are normal. Pt exhibits no distension and no mass. There is no tenderness. There is no rebound, no guarding, but there was left CVA tenderness.   Neurological: pt is alert and oriented to person, place, and time.   Skin: Skin is warm and dry.   Psychiatric: behavior is normal.   Nursing note and vitals reviewed.           Lab Results   Component Value Date/Time    POCCOLOR yellow 06/06/2018 06:13 PM    POCCOLOR Yellow 04/21/2016 09:18 PM    POCAPPEAR cloudy 06/06/2018 06:13 PM    POCAPPEAR Slightly Cloudy (A) 04/21/2016 09:18 PM    POCLEUKEST mod 06/06/2018 06:13 PM    POCLEUKEST Moderate (A) 04/21/2016 09:18 PM    POCNITRITE neg 06/06/2018 06:13 PM    POCNITRITE Negative 04/21/2016 09:18 PM    " POCUROBILIGE neg 06/06/2018 06:13 PM    POCPROTEIN trace 06/06/2018 06:13 PM    POCPROTEIN 100 (A) 04/21/2016 09:18 PM    POCURPH 7.0 06/06/2018 06:13 PM    POCURPH 6.0 04/21/2016 09:18 PM    POCBLOOD trace 06/06/2018 06:13 PM    POCBLOOD Large (A) 04/21/2016 09:18 PM    POCSPGRV 1.010 06/06/2018 06:13 PM    POCSPGRV 1.010 04/21/2016 09:18 PM    POCKETONES neg 06/06/2018 06:13 PM    POCKETONES Negative 04/21/2016 09:18 PM    POCBILIRUBIN neg 06/06/2018 06:13 PM    POCGLUCUA neg 06/06/2018 06:13 PM    POCGLUCUA Negative 04/21/2016 09:18 PM            Assessment/Plan:     1. Acute pyelonephritis     - ciprofloxacin (CIPRO) 500 MG Tab; Take 1 Tab by mouth every 12 hours for 7 days.  Dispense: 14 Tab; Refill: 0  - REFERRAL TO UROLOGY      Follow up in one week if no improvement, sooner if symptoms worsen.

## 2018-06-19 NOTE — TELEPHONE ENCOUNTER
Adarsh Arias M.D.  Vesna Silverio, Med Ass't   Cc: [facility]   Caller: Unspecified (1 week ago)             I e-scribed another inhaler to pharmacy.      Patient informed.

## 2018-12-10 ENCOUNTER — OFFICE VISIT (OUTPATIENT)
Dept: MEDICAL GROUP | Facility: PHYSICIAN GROUP | Age: 43
End: 2018-12-10
Payer: COMMERCIAL

## 2018-12-10 ENCOUNTER — HOSPITAL ENCOUNTER (OUTPATIENT)
Facility: MEDICAL CENTER | Age: 43
End: 2018-12-10
Attending: NURSE PRACTITIONER
Payer: COMMERCIAL

## 2018-12-10 VITALS
HEIGHT: 62 IN | SYSTOLIC BLOOD PRESSURE: 108 MMHG | DIASTOLIC BLOOD PRESSURE: 72 MMHG | OXYGEN SATURATION: 100 % | TEMPERATURE: 98.8 F | RESPIRATION RATE: 14 BRPM | BODY MASS INDEX: 28.71 KG/M2 | HEART RATE: 72 BPM | WEIGHT: 156 LBS

## 2018-12-10 DIAGNOSIS — Z12.4 SCREENING FOR CERVICAL CANCER: ICD-10-CM

## 2018-12-10 DIAGNOSIS — Z01.419 WELL WOMAN EXAM WITH ROUTINE GYNECOLOGICAL EXAM: ICD-10-CM

## 2018-12-10 PROCEDURE — 88175 CYTOPATH C/V AUTO FLUID REDO: CPT

## 2018-12-10 PROCEDURE — 87624 HPV HI-RISK TYP POOLED RSLT: CPT

## 2018-12-10 PROCEDURE — 99386 PREV VISIT NEW AGE 40-64: CPT | Performed by: NURSE PRACTITIONER

## 2018-12-10 PROCEDURE — 99000 SPECIMEN HANDLING OFFICE-LAB: CPT | Performed by: NURSE PRACTITIONER

## 2018-12-10 ASSESSMENT — PATIENT HEALTH QUESTIONNAIRE - PHQ9: CLINICAL INTERPRETATION OF PHQ2 SCORE: 0

## 2018-12-10 NOTE — ASSESSMENT & PLAN NOTE
Here for well woman exam.  Currently has IUD, 10 year placed 6 years ago. Reports periods are normal/regular.  Bleeds 4-5 days + cramps.  No vaginal odor, lesions reported.  Recent UTI.    Past due on mammogram.  No breast changes reported.  No family history of breast cancer.

## 2018-12-11 DIAGNOSIS — Z12.4 SCREENING FOR CERVICAL CANCER: ICD-10-CM

## 2018-12-11 LAB
CYTOLOGY REG CYTOL: NORMAL
HPV HR 12 DNA CVX QL NAA+PROBE: NEGATIVE
HPV16 DNA SPEC QL NAA+PROBE: NEGATIVE
HPV18 DNA SPEC QL NAA+PROBE: NEGATIVE
SPECIMEN SOURCE: NORMAL

## 2018-12-11 NOTE — PROGRESS NOTES
CC:  Pap/Well Woman Exam    History of present illness:  Alis Perdomo is 43 y.o. female presenting today for well woman exam with gynecological exam and Pap smear.   She reports her periods are regular.  She is currently using  IUD as birth control. Diet . Exercise BMI 28.53    Well woman exam with routine gynecological exam  Here for well woman exam.  Currently has IUD, 10 year placed 6 years ago. Reports periods are normal/regular.  Bleeds 4-5 days + cramps.  No vaginal odor, lesions reported.  Recent UTI.    Past due on mammogram.  No breast changes reported.  No family history of breast cancer.       History reviewed. No pertinent past medical history.    Past Surgical History:   Procedure Laterality Date   • PRIMARY C SECTION         Outpatient Encounter Prescriptions as of 12/10/2018   Medication Sig Dispense Refill   • [DISCONTINUED] albuterol (PROAIR HFA) 108 (90 Base) MCG/ACT Aero Soln inhalation aerosol Inhale 2 Puffs by mouth every 6 hours as needed for Shortness of Breath. 8.5 g 3   • [DISCONTINUED] phenazopyridine (PYRIDIUM) 200 MG Tab Take 1 Tab by mouth 3 times a day as needed. 6 Tab 0   • [DISCONTINUED] omeprazole (PRILOSEC) 20 MG delayed-release capsule Take 1 Cap by mouth every day. On empty stomach 21 Cap 0     No facility-administered encounter medications on file as of 12/10/2018.        Patient Active Problem List    Diagnosis Date Noted   • Well woman exam with routine gynecological exam 12/10/2018   • Throat swelling 02/24/2017       .  Social History     Social History   • Marital status:      Spouse name: N/A   • Number of children: N/A   • Years of education: N/A     Occupational History   • Not on file.     Social History Main Topics   • Smoking status: Never Smoker   • Smokeless tobacco: Never Used   • Alcohol use No   • Drug use: No   • Sexual activity: Yes     Birth control/ protection: IUD      Comment: 2012     Other Topics Concern   • Not on file  "    Social History Narrative   • No narrative on file       Family History   Problem Relation Age of Onset   • Diabetes Mother    • Heart Disease Mother    • Hypertension Mother    • Arthritis Mother         lupus   • Thyroid Mother    • No Known Problems Father    • Thyroid Sister          ROS:  Denies Weight loss, fatigue, chest pain, SOB, bowel or bladder changes. No significant dysmenorrhea, concerning vaginal discharge or irritation, no dyspareunia or postcoital bleeding. Denies h/o migraine with aura. Denies musculoskeletal, neurological, or psychiatric problems.      /72 (BP Location: Left arm, Patient Position: Sitting)   Pulse 72   Temp 37.1 °C (98.8 °F) (Temporal)   Resp 14   Ht 1.575 m (5' 2\")   Wt 70.8 kg (156 lb)   LMP 11/20/2018   SpO2 100%   BMI 28.53 kg/m²     GEN:  Appears well and in no apparent distress   NECK:  Supple without adenopathy or thyromegaly  LUNGS:  Clear and equal. No wheeze, ronchi, or rales.  CV:  RRR, S1, S2. No murmur.  Pedal pulses 2+ bilaterally.  BREAST:  Symmetrical without masses. No nipple discharge.  ABD:  Soft, non-tender, non-distended, normal bowel sounds.  No hepatosplenomegaly.  :  Normal external female genitalia.  Vaginal canal clear.  Cervix appears normal. Specimen collected from transformation zone. Bimanual exam:  No CMT, normal size uterus without masses or tenderness; no adnexal masses or tenderness.      Assessment and plan    1. Screening for cervical cancer  Pap obtained from cervical os.  To pathology.  Monitor and follow.   - THINPREP PAP WITH HPV; Future  - MA-SCREEN MAMMO W/CAD-BILAT; Future    2. Well woman exam with routine gynecological exam  Here for well woman exam.  Due for mammogram.  Order provided.  History of recent UTI after periods.  Order provided if she has recurrent symptoms.  Discussed preventative health, diet, exercise, pap and breast screenings.  Return in one year for annual exam.    - UA/M W/RFLX CULTURE, " ROUTINE      F/u pending results    A chaperone was offered to the patient during today's exam. Patient declined chaperone.

## 2019-03-15 ENCOUNTER — OFFICE VISIT (OUTPATIENT)
Dept: URGENT CARE | Facility: PHYSICIAN GROUP | Age: 44
End: 2019-03-15
Payer: COMMERCIAL

## 2019-03-15 VITALS
BODY MASS INDEX: 27.46 KG/M2 | RESPIRATION RATE: 13 BRPM | HEART RATE: 77 BPM | TEMPERATURE: 98.8 F | OXYGEN SATURATION: 100 % | DIASTOLIC BLOOD PRESSURE: 62 MMHG | SYSTOLIC BLOOD PRESSURE: 102 MMHG | WEIGHT: 155 LBS | HEIGHT: 63 IN

## 2019-03-15 DIAGNOSIS — R09.82 POSTNASAL DRIP: ICD-10-CM

## 2019-03-15 PROCEDURE — 99214 OFFICE O/P EST MOD 30 MIN: CPT | Performed by: FAMILY MEDICINE

## 2019-03-15 RX ORDER — FLUTICASONE PROPIONATE 50 MCG
1 SPRAY, SUSPENSION (ML) NASAL 2 TIMES DAILY
Qty: 1 BOTTLE | Refills: 0 | Status: SHIPPED | OUTPATIENT
Start: 2019-03-15 | End: 2019-12-02

## 2019-03-16 NOTE — PROGRESS NOTES
"Subjective:       Chief Complaint   Patient presents with   • Shortness of Breath, cough     on and off since Chris               Shortness of Breath  This is a new problem. Episode onset: 3 mth ago. The problem occurs intermittently. The problem has been unchanged.   She also c/o occasional, dry cough.          Pertinent negatives include no abdominal pain, chest pain, fever, headaches, hemoptysis, leg pain, leg swelling, neck pain, orthopnea, PND, sore throat, syncope or vomiting. Nothing aggravates the symptoms. The patient has no known risk factors for DVT/PE. Pt has tried nothing for the symptoms. There is no history of CAD, chronic lung disease, COPD, DVT, a heart failure or PE.     Social History   Substance Use Topics   • Smoking status: Never Smoker   • Smokeless tobacco: Never Used   • Alcohol use No       No past medical history on file.    No current outpatient prescriptions on file prior to visit.     No current facility-administered medications on file prior to visit.          Review of Systems   Constitutional: Negative for fever.   HENT: Negative for sore throat.    Respiratory: Positive for shortness of breath. Negative for cough and hemoptysis.    Cardiovascular: Negative for chest pain, orthopnea, leg swelling, syncope and PND.   Gastrointestinal: Negative for vomiting and abdominal pain.   Genitourinary: Negative for dysuria and urgency.   Musculoskeletal: Negative for neck pain.   Neurological: Negative for headaches.   All other systems reviewed and are negative.         Objective:     Blood pressure 102/62, pulse 77, temperature 37.1 °C (98.8 °F), resp. rate 13, height 1.6 m (5' 3\"), weight 70.3 kg (155 lb), SpO2 100 %, not currently breastfeeding.    Physical Exam   Constitutional: pt is oriented to person, place, and time.  appears well-developed and well-nourished. No distress.   HENT:   Head: Normocephalic and atraumatic.   Mouth/Throat: No oropharyngeal exudate.  tonsils not visualized. "   Nose - boggy nasal mucosa.   Clear postnasal drip noted.   Eyes: Conjunctivae and EOM are normal. Pupils are equal, round, and reactive to light.   Neck: Neck supple. No JVD present.   Cardiovascular: Normal rate, regular rhythm and normal heart sounds.  Exam reveals no friction rub.    No murmur heard.  Pulmonary/Chest: Effort normal and breath sounds normal. No respiratory distress. Pt has no rales.   Musculoskeletal: pt exhibits no edema or tenderness.   Lymphadenopathy:     She has no cervical adenopathy.   Neurological: pt is alert and oriented to person, place, and time. No cranial nerve deficit.   Skin: Skin is warm. She is not diaphoretic. No erythema.   Psychiatric: Her behavior is normal.   Nursing note and vitals reviewed.              Assessment/Plan:        1. Postnasal drip     - fluticasone (FLONASE) 50 MCG/ACT nasal spray; Spray 1 Spray in nose 2 times a day.  Dispense: 1 Bottle; Refill: 0    Follow up in one week if no improvement, sooner if symptoms worsen.

## 2019-05-23 ENCOUNTER — OFFICE VISIT (OUTPATIENT)
Dept: URGENT CARE | Facility: PHYSICIAN GROUP | Age: 44
End: 2019-05-23
Payer: COMMERCIAL

## 2019-05-23 VITALS
HEIGHT: 63 IN | HEART RATE: 114 BPM | SYSTOLIC BLOOD PRESSURE: 120 MMHG | DIASTOLIC BLOOD PRESSURE: 74 MMHG | OXYGEN SATURATION: 96 % | WEIGHT: 155 LBS | TEMPERATURE: 97.8 F | BODY MASS INDEX: 27.46 KG/M2

## 2019-05-23 DIAGNOSIS — J06.9 VIRAL UPPER RESPIRATORY TRACT INFECTION WITH COUGH: Primary | ICD-10-CM

## 2019-05-23 DIAGNOSIS — J02.9 PHARYNGITIS, UNSPECIFIED ETIOLOGY: ICD-10-CM

## 2019-05-23 DIAGNOSIS — Z87.09 HISTORY OF BRONCHITIS: ICD-10-CM

## 2019-05-23 PROCEDURE — 99214 OFFICE O/P EST MOD 30 MIN: CPT | Performed by: NURSE PRACTITIONER

## 2019-05-23 PROCEDURE — 87880 STREP A ASSAY W/OPTIC: CPT | Performed by: NURSE PRACTITIONER

## 2019-05-23 RX ORDER — BENZONATATE 200 MG/1
200 CAPSULE ORAL EVERY 8 HOURS PRN
Qty: 30 CAP | Refills: 0 | Status: SHIPPED | OUTPATIENT
Start: 2019-05-23 | End: 2019-12-02

## 2019-05-23 RX ORDER — ALBUTEROL SULFATE 90 UG/1
1-2 AEROSOL, METERED RESPIRATORY (INHALATION) EVERY 6 HOURS PRN
Qty: 8.5 G | Refills: 0 | Status: SHIPPED | OUTPATIENT
Start: 2019-05-23 | End: 2019-12-02

## 2019-05-23 RX ORDER — AZITHROMYCIN 250 MG/1
TABLET, FILM COATED ORAL
Qty: 6 TAB | Refills: 0 | Status: SHIPPED | OUTPATIENT
Start: 2019-05-23 | End: 2019-05-28

## 2019-05-23 RX ORDER — METHYLPREDNISOLONE 4 MG/1
TABLET ORAL
Qty: 1 KIT | Refills: 0 | Status: SHIPPED | OUTPATIENT
Start: 2019-05-23 | End: 2019-12-02

## 2019-05-23 ASSESSMENT — ENCOUNTER SYMPTOMS
SHORTNESS OF BREATH: 1
SORE THROAT: 1
COUGH: 1
FEVER: 1

## 2019-05-23 NOTE — PROGRESS NOTES
"Subjective:     Alis Perdomo is a 43 y.o. female who presents for Pharyngitis (fever, cough x3 days)       Cough   This is a new problem. Episode onset: 3 days ago. The problem has been gradually worsening. The cough is productive of sputum. Associated symptoms include a fever, nasal congestion, a sore throat and shortness of breath. She has tried nothing for the symptoms. Her past medical history is significant for bronchitis. There is no history of asthma.     PMH:  has no past medical history of Asthma; Hyperlipidemia; or Hypertension.    MEDS:   Current Outpatient Prescriptions:   •  MethylPREDNISolone (MEDROL DOSEPAK) 4 MG Tablet Therapy Pack, Use as directed on package, Disp: 1 Kit, Rfl: 0  •  benzonatate (TESSALON) 200 MG capsule, Take 1 Cap by mouth every 8 hours as needed for Cough., Disp: 30 Cap, Rfl: 0  •  azithromycin (ZITHROMAX) 250 MG Tab, Take 2 tabs by mouth once today, then one tab by mouth once daily days 2-5., Disp: 6 Tab, Rfl: 0  •  albuterol 108 (90 Base) MCG/ACT Aero Soln inhalation aerosol, Inhale 1-2 Puffs by mouth every 6 hours as needed for Shortness of Breath., Disp: 8.5 g, Rfl: 0  •  fluticasone (FLONASE) 50 MCG/ACT nasal spray, Spray 1 Spray in nose 2 times a day., Disp: 1 Bottle, Rfl: 0    ALLERGIES: No Known Allergies    SURGHX:   Past Surgical History:   Procedure Laterality Date   • PRIMARY C SECTION       SOCHX:  reports that she has never smoked. She has never used smokeless tobacco. She reports that she does not drink alcohol or use drugs.     FH: Reviewed with patient, not pertinent to this visit.     Review of Systems   Constitutional: Positive for fever and malaise/fatigue.   HENT: Positive for congestion and sore throat.    Respiratory: Positive for cough and shortness of breath.    Cardiovascular: Negative.    All other systems reviewed and are negative.    Objective:     /74   Pulse (!) 114   Temp 36.6 °C (97.8 °F) (Temporal)   Ht 1.6 m (5' 3\")   " Wt 70.3 kg (155 lb)   LMP 05/09/2019   SpO2 96%   BMI 27.46 kg/m²     Physical Exam   Constitutional: She is oriented to person, place, and time. She appears well-developed and well-nourished. She is cooperative.  Non-toxic appearance. No distress.   HENT:   Right Ear: Tympanic membrane and external ear normal.   Left Ear: Tympanic membrane and external ear normal.   Nose: Mucosal edema and rhinorrhea present.   Mouth/Throat: Uvula is midline and mucous membranes are normal. Posterior oropharyngeal edema and posterior oropharyngeal erythema present. No oropharyngeal exudate.   Eyes: Pupils are equal, round, and reactive to light. Conjunctivae and EOM are normal.   Neck: Normal range of motion.   Cardiovascular: Regular rhythm, normal heart sounds and normal pulses.  Tachycardia present.    Pulmonary/Chest: Effort normal. No respiratory distress. She has no decreased breath sounds.   Abdominal: Soft. Bowel sounds are normal. There is no tenderness.   Musculoskeletal: Normal range of motion. She exhibits no deformity.   Lymphadenopathy:     She has no cervical adenopathy.   Neurological: She is alert and oriented to person, place, and time. She has normal strength. No sensory deficit.   Skin: Skin is warm, dry and intact. Capillary refill takes less than 2 seconds.   Psychiatric: She has a normal mood and affect. Her behavior is normal.   Vitals reviewed.    Rapid Strep A swab: negative       Assessment/Plan:     1. Viral upper respiratory tract infection with cough  - benzonatate (TESSALON) 200 MG capsule; Take 1 Cap by mouth every 8 hours as needed for Cough.  Dispense: 30 Cap; Refill: 0    2. Pharyngitis, unspecified etiology  - POCT Rapid Strep A    3. History of bronchitis  - MethylPREDNISolone (MEDROL DOSEPAK) 4 MG Tablet Therapy Pack; Use as directed on package  Dispense: 1 Kit; Refill: 0  - azithromycin (ZITHROMAX) 250 MG Tab; Take 2 tabs by mouth once today, then one tab by mouth once daily days 2-5.   Dispense: 6 Tab; Refill: 0  - albuterol 108 (90 Base) MCG/ACT Aero Soln inhalation aerosol; Inhale 1-2 Puffs by mouth every 6 hours as needed for Shortness of Breath.  Dispense: 8.5 g; Refill: 0    Strep A swab negative. Discussed likely viral etiology and expected course and duration of illness. Patient verbalizes concerned due to her history of bronchitis which usually follows upper respiratory illnesses. Rx sent electronically for symptomatic control. Contingent antibiotic for use upon meeting guidelines as previously discussed.    Patient advised to: Return if symptoms worsen or fail to improve, for 1) Symptoms don't improve or worsen, or go to ER, 2) Follow up with primary care in 7-10 days.    Differential diagnosis, natural history, supportive care, and indications for immediate follow-up discussed. All questions answered. Patient agrees with the plan of care.

## 2019-05-24 ASSESSMENT — ENCOUNTER SYMPTOMS: CARDIOVASCULAR NEGATIVE: 1

## 2019-05-26 LAB
INT CON NEG: NORMAL
INT CON POS: NORMAL
S PYO AG THROAT QL: NEGATIVE

## 2019-12-02 ENCOUNTER — OFFICE VISIT (OUTPATIENT)
Dept: MEDICAL GROUP | Facility: PHYSICIAN GROUP | Age: 44
End: 2019-12-02
Payer: COMMERCIAL

## 2019-12-02 VITALS
RESPIRATION RATE: 14 BRPM | WEIGHT: 157 LBS | OXYGEN SATURATION: 99 % | HEIGHT: 62 IN | HEART RATE: 71 BPM | SYSTOLIC BLOOD PRESSURE: 92 MMHG | TEMPERATURE: 98.3 F | DIASTOLIC BLOOD PRESSURE: 70 MMHG | BODY MASS INDEX: 28.89 KG/M2

## 2019-12-02 DIAGNOSIS — M25.541 PAIN INVOLVING JOINTS OF FINGERS OF BOTH HANDS: ICD-10-CM

## 2019-12-02 DIAGNOSIS — K64.0 GRADE I HEMORRHOIDS: ICD-10-CM

## 2019-12-02 DIAGNOSIS — M25.542 PAIN INVOLVING JOINTS OF FINGERS OF BOTH HANDS: ICD-10-CM

## 2019-12-02 PROCEDURE — 99214 OFFICE O/P EST MOD 30 MIN: CPT | Performed by: NURSE PRACTITIONER

## 2019-12-02 ASSESSMENT — PATIENT HEALTH QUESTIONNAIRE - PHQ9: CLINICAL INTERPRETATION OF PHQ2 SCORE: 0

## 2019-12-03 NOTE — PROGRESS NOTES
"Chief Complaint   Patient presents with   • Finger Pain   • Hemorrhoids       Subjective:   Alis Perdomo is a 44 y.o. female here today for evaluation and management of two new acute issues    Pain involving joints of fingers of both hands  Reports that over the last month or so she has had bilateral hand pain and stiffness.  Also reports some ankle pain and stiffness.  Has not taken anything.  Seems to go away in the day, but returns at night.      Grade I hemorrhoids  Reports recent constipation with bleeding hemorrhoids.      Patient has not used any over the counter products for either of these issues.     Current medicines (including changes today)  No current outpatient medications on file.     No current facility-administered medications for this visit.      She  has no past medical history of Asthma, Hyperlipidemia, or Hypertension.    ROS as stated in hpi  No chest pain, no shortness of breath, no abdominal pain       Objective:     BP (!) 92/70 (BP Location: Left arm, Patient Position: Sitting)   Pulse 71   Temp 36.8 °C (98.3 °F) (Temporal)   Resp 14   Ht 1.575 m (5' 2\")   Wt 71.2 kg (157 lb)   SpO2 99%  Body mass index is 28.72 kg/m².   Physical Exam:  Constitutional: Alert, no distress.  Skin: Warm, dry, good turgor,no cyanosis, no rashes in visible areas.  Eye: Equal, round and reactive, conjunctiva clear, lids normal.  Ears: No tenderness, no discharge.  External canals are without any significant edema or erythema. .  Gross auditory acuity is intact.  Nose: symmetrical without tenderness, no discharge.  Mouth/Throat: lips without lesion.  Oropharynx clear.    Neck: Trachea midline, no masses, no obvious thyroid enlargement... Range of motion within normal limits.  Neuro: Cranial nerves 2-12 grossly intact.  No sensory deficit.  Respiratory: Unlabored respiratory effort,  Cardiovascular: Normal S1, S2, no murmur, no edema.  MSK:  Bilateral hands with reported painful 4th and " 5th fingers, no swelling, bruising noted.   Psych: Alert and oriented x3, normal affect and mood and judgement.        Assessment and Plan:   The following treatment plan was discussed    1. Pain involving joints of fingers of both hands  This is anew problem to me.  Acute reported issue.  This may be osteoarthritis or may represent an inflammatory process.  Labs ordered.  Joint survey hands ordered.  Recommended, heat, ice, ibuprofen, over the counter rubs.  Monitor and follow.   - CBC WITH DIFFERENTIAL; Future  - Comp Metabolic Panel; Future  - WESTERGREN SED RATE; Future  - CRP QUANTITIVE (NON-CARDIAC); Future  - OSWALDO REFLEXIVE PROFILE; Future  - RHEUMATOID ARTHRITIS FACTOR; Future  - DX-JOINT SURVEY-HANDS SINGLE VIEW; Future    2. Grade I hemorrhoids  This is a new problem to me.  Acute issue.  Discussed over the counter treatments: avoid constipation, warm sitz baths,  Hemorrhoid creams/suppositories, tucks.  Monitor and follow.       Followup: Return if symptoms worsen or fail to improve.         Educated in proper administration of medication(s) ordered today including safety, possible SE, risks, benefits, rationale and alternatives to therapy.     Please note that this dictation was created using voice recognition software. I have made every reasonable attempt to correct obvious errors, but I expect that there are errors of grammar and possibly content that I did not discover before finalizing the note.

## 2019-12-03 NOTE — ASSESSMENT & PLAN NOTE
Reports that over the last month or so she has had bilateral hand pain and stiffness.  Also reports some ankle pain and stiffness.  Has not taken anything.  Seems to go away in the day, but returns at night.

## 2019-12-05 ENCOUNTER — HOSPITAL ENCOUNTER (OUTPATIENT)
Dept: RADIOLOGY | Facility: MEDICAL CENTER | Age: 44
End: 2019-12-05
Attending: NURSE PRACTITIONER
Payer: COMMERCIAL

## 2019-12-05 DIAGNOSIS — M25.541 PAIN INVOLVING JOINTS OF FINGERS OF BOTH HANDS: ICD-10-CM

## 2019-12-05 DIAGNOSIS — M25.542 PAIN INVOLVING JOINTS OF FINGERS OF BOTH HANDS: ICD-10-CM

## 2019-12-05 PROCEDURE — 77077 JOINT SURVEY SINGLE VIEW: CPT

## 2019-12-07 ENCOUNTER — HOSPITAL ENCOUNTER (OUTPATIENT)
Dept: LAB | Facility: MEDICAL CENTER | Age: 44
End: 2019-12-07
Attending: NURSE PRACTITIONER
Payer: COMMERCIAL

## 2019-12-07 DIAGNOSIS — M25.541 PAIN INVOLVING JOINTS OF FINGERS OF BOTH HANDS: ICD-10-CM

## 2019-12-07 DIAGNOSIS — M25.542 PAIN INVOLVING JOINTS OF FINGERS OF BOTH HANDS: ICD-10-CM

## 2019-12-07 LAB
ALBUMIN SERPL BCP-MCNC: 4 G/DL (ref 3.2–4.9)
ALBUMIN/GLOB SERPL: 1.2 G/DL
ALP SERPL-CCNC: 57 U/L (ref 30–99)
ALT SERPL-CCNC: 45 U/L (ref 2–50)
ANION GAP SERPL CALC-SCNC: 8 MMOL/L (ref 0–11.9)
ANISOCYTOSIS BLD QL SMEAR: ABNORMAL
AST SERPL-CCNC: 38 U/L (ref 12–45)
BASOPHILS # BLD AUTO: 0.6 % (ref 0–1.8)
BASOPHILS # BLD: 0.03 K/UL (ref 0–0.12)
BILIRUB SERPL-MCNC: 0.5 MG/DL (ref 0.1–1.5)
BUN SERPL-MCNC: 11 MG/DL (ref 8–22)
CALCIUM SERPL-MCNC: 9 MG/DL (ref 8.5–10.5)
CHLORIDE SERPL-SCNC: 106 MMOL/L (ref 96–112)
CO2 SERPL-SCNC: 24 MMOL/L (ref 20–33)
COMMENT 1642: NORMAL
CREAT SERPL-MCNC: 0.52 MG/DL (ref 0.5–1.4)
CRP SERPL HS-MCNC: 0.12 MG/DL (ref 0–0.75)
DACRYOCYTES BLD QL SMEAR: NORMAL
EOSINOPHIL # BLD AUTO: 0.17 K/UL (ref 0–0.51)
EOSINOPHIL NFR BLD: 3.3 % (ref 0–6.9)
ERYTHROCYTE [DISTWIDTH] IN BLOOD BY AUTOMATED COUNT: 42.3 FL (ref 35.9–50)
ERYTHROCYTE [SEDIMENTATION RATE] IN BLOOD BY WESTERGREN METHOD: 12 MM/HOUR (ref 0–20)
FASTING STATUS PATIENT QL REPORTED: NORMAL
GIANT PLATELETS BLD QL SMEAR: NORMAL
GLOBULIN SER CALC-MCNC: 3.4 G/DL (ref 1.9–3.5)
GLUCOSE SERPL-MCNC: 90 MG/DL (ref 65–99)
HCT VFR BLD AUTO: 28.8 % (ref 37–47)
HGB BLD-MCNC: 7.5 G/DL (ref 12–16)
HYPOCHROMIA BLD QL SMEAR: ABNORMAL
IMM GRANULOCYTES # BLD AUTO: 0.02 K/UL (ref 0–0.11)
IMM GRANULOCYTES NFR BLD AUTO: 0.4 % (ref 0–0.9)
LYMPHOCYTES # BLD AUTO: 2.97 K/UL (ref 1–4.8)
LYMPHOCYTES NFR BLD: 58.2 % (ref 22–41)
MCH RBC QN AUTO: 15.8 PG (ref 27–33)
MCHC RBC AUTO-ENTMCNC: 26 G/DL (ref 33.6–35)
MCV RBC AUTO: 60.8 FL (ref 81.4–97.8)
MICROCYTES BLD QL SMEAR: ABNORMAL
MONOCYTES # BLD AUTO: 0.39 K/UL (ref 0–0.85)
MONOCYTES NFR BLD AUTO: 7.6 % (ref 0–13.4)
MORPHOLOGY BLD-IMP: NORMAL
NEUTROPHILS # BLD AUTO: 1.52 K/UL (ref 2–7.15)
NEUTROPHILS NFR BLD: 29.9 % (ref 44–72)
NRBC # BLD AUTO: 0 K/UL
NRBC BLD-RTO: 0 /100 WBC
OVALOCYTES BLD QL SMEAR: NORMAL
PLATELET # BLD AUTO: 365 K/UL (ref 164–446)
PLATELET BLD QL SMEAR: NORMAL
POIKILOCYTOSIS BLD QL SMEAR: NORMAL
POTASSIUM SERPL-SCNC: 3.8 MMOL/L (ref 3.6–5.5)
PROT SERPL-MCNC: 7.4 G/DL (ref 6–8.2)
RBC # BLD AUTO: 4.74 M/UL (ref 4.2–5.4)
RBC BLD AUTO: PRESENT
RHEUMATOID FACT SER IA-ACNC: <10 IU/ML (ref 0–14)
SODIUM SERPL-SCNC: 138 MMOL/L (ref 135–145)
WBC # BLD AUTO: 5.1 K/UL (ref 4.8–10.8)

## 2019-12-07 PROCEDURE — 36415 COLL VENOUS BLD VENIPUNCTURE: CPT

## 2019-12-07 PROCEDURE — 86038 ANTINUCLEAR ANTIBODIES: CPT

## 2019-12-07 PROCEDURE — 80053 COMPREHEN METABOLIC PANEL: CPT

## 2019-12-07 PROCEDURE — 85025 COMPLETE CBC W/AUTO DIFF WBC: CPT

## 2019-12-07 PROCEDURE — 86431 RHEUMATOID FACTOR QUANT: CPT

## 2019-12-07 PROCEDURE — 86140 C-REACTIVE PROTEIN: CPT

## 2019-12-07 PROCEDURE — 86225 DNA ANTIBODY NATIVE: CPT

## 2019-12-07 PROCEDURE — 86039 ANTINUCLEAR ANTIBODIES (ANA): CPT

## 2019-12-07 PROCEDURE — 86235 NUCLEAR ANTIGEN ANTIBODY: CPT

## 2019-12-07 PROCEDURE — 85652 RBC SED RATE AUTOMATED: CPT

## 2019-12-09 LAB — NUCLEAR IGG SER QL IA: DETECTED

## 2019-12-10 LAB
ANA INTERPRETIVE COMMENT Q5143: ABNORMAL
ANA PATTERN Q5144: ABNORMAL
ANA TITER Q5145: ABNORMAL
ANTINUCLEAR ANTIBODY (ANA), HEP-2, IGG Q5142: DETECTED

## 2019-12-11 ENCOUNTER — OFFICE VISIT (OUTPATIENT)
Dept: MEDICAL GROUP | Facility: PHYSICIAN GROUP | Age: 44
End: 2019-12-11
Payer: COMMERCIAL

## 2019-12-11 VITALS
OXYGEN SATURATION: 98 % | DIASTOLIC BLOOD PRESSURE: 74 MMHG | HEART RATE: 85 BPM | RESPIRATION RATE: 14 BRPM | SYSTOLIC BLOOD PRESSURE: 124 MMHG | TEMPERATURE: 97.8 F | HEIGHT: 62 IN | WEIGHT: 157 LBS | BODY MASS INDEX: 28.89 KG/M2

## 2019-12-11 DIAGNOSIS — Z12.39 SCREENING FOR BREAST CANCER: ICD-10-CM

## 2019-12-11 DIAGNOSIS — R76.8 POSITIVE ANA (ANTINUCLEAR ANTIBODY): ICD-10-CM

## 2019-12-11 DIAGNOSIS — D50.9 IRON DEFICIENCY ANEMIA, UNSPECIFIED IRON DEFICIENCY ANEMIA TYPE: ICD-10-CM

## 2019-12-11 LAB
DSDNA AB TITR SER CLIF: NORMAL {TITER}
ENA JO1 AB TITR SER: 2 AU/ML (ref 0–40)
ENA SCL70 IGG SER QL: 6 AU/ML (ref 0–40)
ENA SM IGG SER-ACNC: 2 AU/ML (ref 0–40)
ENA SS-B IGG SER IA-ACNC: 2 AU/ML (ref 0–40)
SSA52 R0ENA AB IGG Q0420: 4 AU/ML (ref 0–40)
SSA60 R0ENA AB IGG Q0419: 42 AU/ML (ref 0–40)
U1 SNRNP IGG SER QL: 11 AU/ML (ref 0–40)

## 2019-12-11 PROCEDURE — 99214 OFFICE O/P EST MOD 30 MIN: CPT | Performed by: NURSE PRACTITIONER

## 2019-12-11 NOTE — ASSESSMENT & PLAN NOTE
Mother has lupus.  + sohail on testing with +SSA antibodies.  Joint pain in hands and feet.  Negative RF and joint survey.  No rash or ongoing back pain or hip pain reported.  Some occasional knee pain along with ankles and hands.

## 2019-12-11 NOTE — ASSESSMENT & PLAN NOTE
New finding on recent CBC.  Hgb at 7.5  Reports past history of anemia.  Reports heavy periods.  Reports very little meat intake.  Reports some fatigue, but not excessive. No chest pain or shortness of breath reported.

## 2019-12-11 NOTE — PROGRESS NOTES
"Chief Complaint   Patient presents with   • Follow-Up   • Results     labs        Subjective:   Alis Perdomo is a 44 y.o. female here today for evaluation and management of two new, acute issues    Positive SOHAIL (antinuclear antibody)  Mother has lupus.  + sohail on testing with +SSA antibodies.  Joint pain in hands and feet.  Negative RF and joint survey.  No rash or ongoing back pain or hip pain reported.  Some occasional knee pain along with ankles and hands.     Iron deficiency anemia  New finding on recent CBC.  Hgb at 7.5  Reports past history of anemia.  Reports heavy periods.  Reports very little meat intake.  Reports some fatigue, but not excessive. No chest pain or shortness of breath reported.          Current medicines (including changes today)  No current outpatient medications on file.     No current facility-administered medications for this visit.      She  has no past medical history of Asthma, Hyperlipidemia, or Hypertension.    ROS as stated in hpi  No chest pain, no shortness of breath, no abdominal pain       Objective:     /74 (BP Location: Left arm, Patient Position: Sitting)   Pulse 85   Temp 36.6 °C (97.8 °F) (Temporal)   Resp 14   Ht 1.575 m (5' 2\")   Wt 71.2 kg (157 lb)   SpO2 98%  Body mass index is 28.72 kg/m². stable.   Physical Exam:  Constitutional: Alert, no distress.  Skin: Warm, dry, good turgor,no cyanosis, no rashes in visible areas.  Eye: Equal, round and reactive, conjunctiva clear, lids normal.  Ears: No tenderness, no discharge.  External canals are without any significant edema or erythema.  Tympanic membranes are without any inflammation, no effusion.  Gross auditory acuity is intact.  Nose: symmetrical without tenderness, no discharge.  Mouth/Throat: lips without lesion.  Oropharynx clear.  Neck: Trachea midline, no masses, no obvious thyroid enlargement.. No cervical or supraclavicular lymphadenopathy. Range of motion within normal " limits.  Neuro: Cranial nerves 2-12 grossly intact.  No sensory deficit.  Respiratory: Unlabored respiratory effort, lungs clear to auscultation, no wheezes, no ronchi.  Cardiovascular: Normal S1, S2, no murmur, no edema.  MSK:  No obvious joint swelling noted today  Psych: Alert and oriented x3, normal affect and mood and judgement.        Assessment and Plan:   The following treatment plan was discussed    1. Iron deficiency anemia, unspecified iron deficiency anemia type  This is a new problem to me.  Acute finding on recent labs.  OTC iron supplemtn, add vitamin C to improve absorption.  Focus on iron rich foods.  Repeat CBC in 2 months, if no improvement, may refer to GYN to review periods.  May also consider transvaginal ultrasound to rule out cause for heavy bleeding.  Monitor and follow.   - CBC WITH DIFFERENTIAL; Future    2. Positive OSWALDO (antinuclear antibody)  This is anew problem to me.  Acute finding on labs.  Suspect autoimmune arthritis.  Referral to rheumatology.  Ibuprofen prn for pain.  Monitor and follow.   - REFERRAL TO RHEUMATOLOGY    3. Screening for breast cancer  Overdue for mammogram.  Order provided.  Monitor and follow.   - MA-SCREEN MAMMO W/CAD-BILAT; Future      Followup: Return in about 6 months (around 6/11/2020).         Educated in proper administration of medication(s) ordered today including safety, possible SE, risks, benefits, rationale and alternatives to therapy.     Please note that this dictation was created using voice recognition software. I have made every reasonable attempt to correct obvious errors, but I expect that there are errors of grammar and possibly content that I did not discover before finalizing the note.

## 2020-01-10 ENCOUNTER — APPOINTMENT (OUTPATIENT)
Dept: RADIOLOGY | Facility: MEDICAL CENTER | Age: 45
End: 2020-01-10
Attending: NURSE PRACTITIONER
Payer: COMMERCIAL

## 2020-01-10 DIAGNOSIS — Z12.31 VISIT FOR SCREENING MAMMOGRAM: ICD-10-CM

## 2020-02-11 ENCOUNTER — HOSPITAL ENCOUNTER (OUTPATIENT)
Dept: LAB | Facility: MEDICAL CENTER | Age: 45
End: 2020-02-11
Attending: NURSE PRACTITIONER
Payer: COMMERCIAL

## 2020-02-11 DIAGNOSIS — D50.9 IRON DEFICIENCY ANEMIA, UNSPECIFIED IRON DEFICIENCY ANEMIA TYPE: ICD-10-CM

## 2020-02-11 LAB
ANISOCYTOSIS BLD QL SMEAR: ABNORMAL
BASOPHILS # BLD AUTO: 1.7 % (ref 0–1.8)
BASOPHILS # BLD: 0.12 K/UL (ref 0–0.12)
EOSINOPHIL # BLD AUTO: 0.06 K/UL (ref 0–0.51)
EOSINOPHIL NFR BLD: 0.9 % (ref 0–6.9)
ERYTHROCYTE [DISTWIDTH] IN BLOOD BY AUTOMATED COUNT: 65.6 FL (ref 35.9–50)
HCT VFR BLD AUTO: 39.7 % (ref 37–47)
HGB BLD-MCNC: 11.4 G/DL (ref 12–16)
HYPOCHROMIA BLD QL SMEAR: ABNORMAL
LYMPHOCYTES # BLD AUTO: 3.46 K/UL (ref 1–4.8)
LYMPHOCYTES NFR BLD: 48.7 % (ref 22–41)
MANUAL DIFF BLD: NORMAL
MCH RBC QN AUTO: 21.8 PG (ref 27–33)
MCHC RBC AUTO-ENTMCNC: 28.7 G/DL (ref 33.6–35)
MCV RBC AUTO: 76.1 FL (ref 81.4–97.8)
MICROCYTES BLD QL SMEAR: ABNORMAL
MONOCYTES # BLD AUTO: 0.44 K/UL (ref 0–0.85)
MONOCYTES NFR BLD AUTO: 6.2 % (ref 0–13.4)
MORPHOLOGY BLD-IMP: NORMAL
NEUTROPHILS # BLD AUTO: 3.02 K/UL (ref 2–7.15)
NEUTROPHILS NFR BLD: 42.5 % (ref 44–72)
NRBC # BLD AUTO: 0 K/UL
NRBC BLD-RTO: 0 /100 WBC
PLATELET # BLD AUTO: 268 K/UL (ref 164–446)
PLATELET BLD QL SMEAR: NORMAL
POIKILOCYTOSIS BLD QL SMEAR: NORMAL
POLYCHROMASIA BLD QL SMEAR: NORMAL
RBC # BLD AUTO: 5.22 M/UL (ref 4.2–5.4)
RBC BLD AUTO: PRESENT
SCHISTOCYTES BLD QL SMEAR: NORMAL
VARIANT LYMPHS BLD QL SMEAR: NORMAL
WBC # BLD AUTO: 7.1 K/UL (ref 4.8–10.8)

## 2020-02-11 PROCEDURE — 85007 BL SMEAR W/DIFF WBC COUNT: CPT

## 2020-02-11 PROCEDURE — 36415 COLL VENOUS BLD VENIPUNCTURE: CPT

## 2020-02-11 PROCEDURE — 85027 COMPLETE CBC AUTOMATED: CPT

## 2020-11-30 ENCOUNTER — TELEMEDICINE (OUTPATIENT)
Dept: MEDICAL GROUP | Facility: PHYSICIAN GROUP | Age: 45
End: 2020-11-30
Payer: COMMERCIAL

## 2020-11-30 VITALS — RESPIRATION RATE: 12 BRPM | BODY MASS INDEX: 28.89 KG/M2 | HEIGHT: 62 IN | WEIGHT: 157 LBS

## 2020-11-30 DIAGNOSIS — Z11.59 SCREENING FOR VIRAL DISEASE: ICD-10-CM

## 2020-11-30 DIAGNOSIS — R09.89 UPPER RESPIRATORY SYMPTOM: ICD-10-CM

## 2020-11-30 PROCEDURE — 99214 OFFICE O/P EST MOD 30 MIN: CPT | Mod: 95,CR,CS | Performed by: NURSE PRACTITIONER

## 2020-11-30 RX ORDER — AZITHROMYCIN 250 MG/1
TABLET, FILM COATED ORAL
Qty: 6 TAB | Refills: 0 | Status: SHIPPED | OUTPATIENT
Start: 2020-11-30 | End: 2020-12-05

## 2020-11-30 RX ORDER — ALBUTEROL SULFATE 90 UG/1
2 AEROSOL, METERED RESPIRATORY (INHALATION) EVERY 4 HOURS PRN
Qty: 8 G | Refills: 1 | Status: SHIPPED | OUTPATIENT
Start: 2020-11-30

## 2020-11-30 ASSESSMENT — PATIENT HEALTH QUESTIONNAIRE - PHQ9: CLINICAL INTERPRETATION OF PHQ2 SCORE: 0

## 2020-11-30 ASSESSMENT — FIBROSIS 4 INDEX: FIB4 SCORE: 0.97

## 2020-11-30 NOTE — ASSESSMENT & PLAN NOTE
"Reports last week she had a temp, upper respiratory congestion and dry cough. No fever reported today, but unable to verify on virtual visit.   Reports that she gets a \"bronchitis\" yearly.  Feels that this is the issue.  Denies household or work contact with covid 19.  Has not been using any over the counter medications.  Would like refill on inhaler.   "

## 2020-11-30 NOTE — PROGRESS NOTES
"Virtual Visit: Established Patient   This visit was conducted via Zoom using secure and encrypted videoconferencing technology. The patient was in a private location in the state of [place].    The patient's identity was confirmed and verbal consent was obtained for this virtual visit.    Subjective:   CC:   Chief Complaint   Patient presents with   • OLAMIDE Perdomo is a 45 y.o. female presenting for evaluation and management of:    Upper respiratory symptom  Reports last week she had a temp, upper respiratory congestion and dry cough. No fever reported today, but unable to verify on virtual visit.   Reports that she gets a \"bronchitis\" yearly.  Feels that this is the issue.  Denies household or work contact with covid 19.  Has not been using any over the counter medications.  Would like refill on inhaler.       ROS as stated in hpi  Denies any recent fevers or chills. No nausea or vomiting. No chest pains or shortness of breath.     No Known Allergies    Current medicines (including changes today)  Current Outpatient Medications   Medication Sig Dispense Refill   • azithromycin (ZITHROMAX) 250 MG Tab 2 tabs by mouth day 1, 1 tab by mouth days 2-5 6 Tab 0   • albuterol 108 (90 Base) MCG/ACT Aero Soln inhalation aerosol Inhale 2 Puffs every four hours as needed for Shortness of Breath. 8 g 1     No current facility-administered medications for this visit.        Patient Active Problem List    Diagnosis Date Noted   • Upper respiratory symptom 11/30/2020   • Iron deficiency anemia 12/11/2019   • Positive OSWALDO (antinuclear antibody) 12/11/2019   • Pain involving joints of fingers of both hands 12/02/2019   • Grade I hemorrhoids 12/02/2019   • Well woman exam with routine gynecological exam 12/10/2018   • Throat swelling 02/24/2017       Family History   Problem Relation Age of Onset   • Diabetes Mother    • Heart Disease Mother    • Hypertension Mother    • Arthritis Mother         lupus   • " "Thyroid Mother    • No Known Problems Father    • Thyroid Sister        She  has no past medical history of Asthma, Hyperlipidemia, or Hypertension.  She  has a past surgical history that includes primary c section.       Objective:   Resp 12   Ht 1.575 m (5' 2\")   Wt 71.2 kg (157 lb)   LMP 11/26/2020 (Exact Date)   BMI 28.72 kg/m²     Physical Exam:  Constitutional: Alert, no distress, well-groomed.  Skin: No rashes in visible areas.  Eye: Round. Conjunctiva clear, lids normal. No icterus.   ENMT: Lips pink without lesions, good dentition, moist mucous membranes. Phonation normal.  Neck: No masses, no thyromegaly. Moves freely without pain.  Respiratory: Unlabored respiratory effort, no cough or audible wheeze  Psych: Alert and oriented x3, normal affect and mood.       Assessment and Plan:   The following treatment plan was discussed:     1. Upper respiratory symptom  New problem to me.  Acute.  This could represent COVID, URI, viral or an early bronchitis.    Screening for COVID ordered.  Will refill albuterol inhaler and send Z pack to pharmacy to cover.  Await results.  Advised to isolate from family and work at this time until results are back.  Treat with over the counter remedies.  Monitor and follow.     2. Screening for viral disease  - COVID/SARS COV-2 PCR; Future    Other orders  - azithromycin (ZITHROMAX) 250 MG Tab; 2 tabs by mouth day 1, 1 tab by mouth days 2-5  Dispense: 6 Tab; Refill: 0  - albuterol 108 (90 Base) MCG/ACT Aero Soln inhalation aerosol; Inhale 2 Puffs every four hours as needed for Shortness of Breath.  Dispense: 8 g; Refill: 1        Follow-up: Return if symptoms worsen or fail to improve.         "

## 2022-10-05 ENCOUNTER — OFFICE VISIT (OUTPATIENT)
Dept: MEDICAL GROUP | Facility: PHYSICIAN GROUP | Age: 47
End: 2022-10-05
Payer: COMMERCIAL

## 2022-10-05 VITALS
OXYGEN SATURATION: 98 % | WEIGHT: 168 LBS | TEMPERATURE: 97.9 F | HEART RATE: 65 BPM | HEIGHT: 62 IN | BODY MASS INDEX: 30.91 KG/M2 | RESPIRATION RATE: 16 BRPM | SYSTOLIC BLOOD PRESSURE: 110 MMHG | DIASTOLIC BLOOD PRESSURE: 68 MMHG

## 2022-10-05 DIAGNOSIS — Z97.5 IUD (INTRAUTERINE DEVICE) IN PLACE: ICD-10-CM

## 2022-10-05 DIAGNOSIS — Z76.89 ENCOUNTER TO ESTABLISH CARE: ICD-10-CM

## 2022-10-05 DIAGNOSIS — E66.9 OBESITY (BMI 30-39.9): ICD-10-CM

## 2022-10-05 DIAGNOSIS — E55.9 VITAMIN D DEFICIENCY: ICD-10-CM

## 2022-10-05 DIAGNOSIS — D50.9 IRON DEFICIENCY ANEMIA, UNSPECIFIED IRON DEFICIENCY ANEMIA TYPE: ICD-10-CM

## 2022-10-05 DIAGNOSIS — Z12.31 BREAST CANCER SCREENING BY MAMMOGRAM: ICD-10-CM

## 2022-10-05 DIAGNOSIS — Z13.228 SCREENING FOR METABOLIC DISORDER: ICD-10-CM

## 2022-10-05 PROCEDURE — 99214 OFFICE O/P EST MOD 30 MIN: CPT | Performed by: NURSE PRACTITIONER

## 2022-10-05 ASSESSMENT — ENCOUNTER SYMPTOMS
EYES NEGATIVE: 1
SPUTUM PRODUCTION: 0
SHORTNESS OF BREATH: 0
GASTROINTESTINAL NEGATIVE: 1
MUSCULOSKELETAL NEGATIVE: 1
PALPITATIONS: 0
CONSTITUTIONAL NEGATIVE: 1
FEVER: 0
PSYCHIATRIC NEGATIVE: 1
NEUROLOGICAL NEGATIVE: 1
COUGH: 0

## 2022-10-05 ASSESSMENT — PATIENT HEALTH QUESTIONNAIRE - PHQ9: CLINICAL INTERPRETATION OF PHQ2 SCORE: 0

## 2022-10-05 NOTE — PROGRESS NOTES
Subjective:     CC:    Chief Complaint   Patient presents with    Establish Care        HISTORY OF THE PRESENT ILLNESS: Patient is a pleasant 47 y.o. female, here today to establish care. Prior PCP was Tiffany Benoit. She has a past history of anemia. No active concerns today. She would like to get her IUD replaced.  The below problems were discussed/reviewed at this visit:    Problem   IUD (Intrauterine Device) in Place    M1; LMP-10/5/2022; spotted x5days  IUD inserted 10 yrs ago. She would like it replaced.   PAP 2018- NIL, -HPV  - referral submitted for her to see GYN to replace IUD & do her PAP     Vitamin D Deficiency    Low vitamin D in the past.   Takes vitamin supplements when she remembers     Obesity (Bmi 30-39.9)   Iron Deficiency Anemia    Low H/H in the past (2019). States she took iron supplements before. No fatigue, CP, SOB reported today  - check CBC, iron panel with labs       Patient Active Problem List   Diagnosis    Throat swelling    Well woman exam with routine gynecological exam    Pain involving joints of fingers of both hands    Grade I hemorrhoids    Iron deficiency anemia    Positive OSWALDO (antinuclear antibody)    Upper respiratory symptom    IUD (intrauterine device) in place    Vitamin D deficiency    Obesity (BMI 30-39.9)       History reviewed. No pertinent past medical history.     Current Outpatient Medications Ordered in Epic   Medication Sig Dispense Refill    albuterol 108 (90 Base) MCG/ACT Aero Soln inhalation aerosol Inhale 2 Puffs every four hours as needed for Shortness of Breath. 8 g 1     No current [facility]-ordered facility-administered medications on file.        Past Surgical History:   Procedure Laterality Date    PRIMARY C SECTION          Allergies:  Patient has no known allergies.    Health Maintenance: Completed  M1; LMP-10/5/2022; spotted x5days  IUD inserted 10 yrs ago.   PAP 2018; NIL, -ve HPV    ROS:   Review of Systems   Constitutional: Negative.  Negative  "for fever and malaise/fatigue.   HENT: Negative.     Eyes: Negative.    Respiratory:  Negative for cough, sputum production and shortness of breath.    Cardiovascular:  Negative for chest pain, palpitations and leg swelling.   Gastrointestinal: Negative.    Genitourinary: Negative.    Musculoskeletal: Negative.    Neurological: Negative.    Endo/Heme/Allergies: Negative.    Psychiatric/Behavioral: Negative.         Objective:     Exam: /68 (BP Location: Left arm, Patient Position: Sitting, BP Cuff Size: Small adult)   Pulse 65   Temp 36.6 °C (97.9 °F) (Temporal)   Resp 16   Ht 1.575 m (5' 2\")   Wt 76.2 kg (168 lb)   SpO2 98%  Body mass index is 30.73 kg/m².    Physical Exam  Constitutional:       Appearance: Normal appearance.   Cardiovascular:      Rate and Rhythm: Normal rate and regular rhythm.      Pulses: Normal pulses.      Heart sounds: Normal heart sounds.   Pulmonary:      Effort: Pulmonary effort is normal.      Breath sounds: Normal breath sounds.   Musculoskeletal:         General: Normal range of motion.      Cervical back: Normal range of motion and neck supple.      Right lower leg: No edema.      Left lower leg: No edema.   Skin:     General: Skin is warm and dry.   Neurological:      General: No focal deficit present.      Mental Status: She is alert and oriented to person, place, and time.   Psychiatric:         Mood and Affect: Mood normal.         Behavior: Behavior normal.         Thought Content: Thought content normal.         Judgment: Judgment normal.     Assessment & Plan:   47 y.o. female with the following -    Problem List Items Addressed This Visit       Iron deficiency anemia    Relevant Orders    CBC WITHOUT DIFFERENTIAL    IRON/TOTAL IRON BIND    IUD (intrauterine device) in place    Relevant Orders    Referral to Gynecology    Vitamin D deficiency     Continue vitamin supplements  - check vit D level with labs         Relevant Orders    VITAMIN D,25 HYDROXY (DEFICIENCY) "    Obesity (BMI 30-39.9)    Relevant Orders    Patient identified as having weight management issue.  Appropriate orders and counseling given.     Other Visit Diagnoses       Encounter to establish care        Relevant Orders    Comp Metabolic Panel    Lipid Profile    TSH WITH REFLEX TO FT4    Breast cancer screening by mammogram        Relevant Orders    MA-SCREENING MAMMO BILAT W/TOMOSYNTHESIS W/CAD    Screening for metabolic disorder        BMI 30.73    Relevant Orders    Comp Metabolic Panel    Lipid Profile    TSH WITH REFLEX TO FT4          Return in about 6 months (around 4/5/2023) for Annual Visit.    Please note that this dictation was created using voice recognition software. I have made every reasonable attempt to correct obvious errors, but I expect that there are errors of grammar and possibly content that I did not discover before finalizing the note.

## 2022-10-15 ENCOUNTER — HOSPITAL ENCOUNTER (OUTPATIENT)
Dept: LAB | Facility: MEDICAL CENTER | Age: 47
End: 2022-10-15
Attending: NURSE PRACTITIONER
Payer: COMMERCIAL

## 2022-10-15 DIAGNOSIS — Z76.89 ENCOUNTER TO ESTABLISH CARE: ICD-10-CM

## 2022-10-15 DIAGNOSIS — D50.9 IRON DEFICIENCY ANEMIA, UNSPECIFIED IRON DEFICIENCY ANEMIA TYPE: ICD-10-CM

## 2022-10-15 DIAGNOSIS — E55.9 VITAMIN D DEFICIENCY: ICD-10-CM

## 2022-10-15 DIAGNOSIS — Z13.228 SCREENING FOR METABOLIC DISORDER: ICD-10-CM

## 2022-10-15 LAB
25(OH)D3 SERPL-MCNC: 20 NG/ML (ref 30–100)
ALBUMIN SERPL BCP-MCNC: 4.2 G/DL (ref 3.2–4.9)
ALBUMIN/GLOB SERPL: 1.1 G/DL
ALP SERPL-CCNC: 72 U/L (ref 30–99)
ALT SERPL-CCNC: 40 U/L (ref 2–50)
ANION GAP SERPL CALC-SCNC: 11 MMOL/L (ref 7–16)
AST SERPL-CCNC: 40 U/L (ref 12–45)
BILIRUB SERPL-MCNC: 0.4 MG/DL (ref 0.1–1.5)
BUN SERPL-MCNC: 9 MG/DL (ref 8–22)
CALCIUM SERPL-MCNC: 9.1 MG/DL (ref 8.5–10.5)
CHLORIDE SERPL-SCNC: 104 MMOL/L (ref 96–112)
CHOLEST SERPL-MCNC: 138 MG/DL (ref 100–199)
CO2 SERPL-SCNC: 23 MMOL/L (ref 20–33)
CREAT SERPL-MCNC: 0.61 MG/DL (ref 0.5–1.4)
ERYTHROCYTE [DISTWIDTH] IN BLOOD BY AUTOMATED COUNT: 46.9 FL (ref 35.9–50)
GFR SERPLBLD CREATININE-BSD FMLA CKD-EPI: 110 ML/MIN/1.73 M 2
GLOBULIN SER CALC-MCNC: 3.9 G/DL (ref 1.9–3.5)
GLUCOSE SERPL-MCNC: 95 MG/DL (ref 65–99)
HCT VFR BLD AUTO: 37.9 % (ref 37–47)
HDLC SERPL-MCNC: 38 MG/DL
HGB BLD-MCNC: 11.2 G/DL (ref 12–16)
IRON SATN MFR SERPL: 5 % (ref 15–55)
IRON SERPL-MCNC: 24 UG/DL (ref 40–170)
LDLC SERPL CALC-MCNC: 82 MG/DL
MCH RBC QN AUTO: 21.5 PG (ref 27–33)
MCHC RBC AUTO-ENTMCNC: 29.6 G/DL (ref 33.6–35)
MCV RBC AUTO: 72.7 FL (ref 81.4–97.8)
PLATELET # BLD AUTO: 321 K/UL (ref 164–446)
PMV BLD AUTO: 11.1 FL (ref 9–12.9)
POTASSIUM SERPL-SCNC: 3.9 MMOL/L (ref 3.6–5.5)
PROT SERPL-MCNC: 8.1 G/DL (ref 6–8.2)
RBC # BLD AUTO: 5.21 M/UL (ref 4.2–5.4)
SODIUM SERPL-SCNC: 138 MMOL/L (ref 135–145)
TIBC SERPL-MCNC: 459 UG/DL (ref 250–450)
TRIGL SERPL-MCNC: 92 MG/DL (ref 0–149)
TSH SERPL DL<=0.005 MIU/L-ACNC: 1.83 UIU/ML (ref 0.38–5.33)
UIBC SERPL-MCNC: 435 UG/DL (ref 110–370)
WBC # BLD AUTO: 5.9 K/UL (ref 4.8–10.8)

## 2022-10-15 PROCEDURE — 84443 ASSAY THYROID STIM HORMONE: CPT

## 2022-10-15 PROCEDURE — 82306 VITAMIN D 25 HYDROXY: CPT

## 2022-10-15 PROCEDURE — 83540 ASSAY OF IRON: CPT

## 2022-10-15 PROCEDURE — 80053 COMPREHEN METABOLIC PANEL: CPT

## 2022-10-15 PROCEDURE — 85027 COMPLETE CBC AUTOMATED: CPT

## 2022-10-15 PROCEDURE — 80061 LIPID PANEL: CPT

## 2022-10-15 PROCEDURE — 83550 IRON BINDING TEST: CPT

## 2022-10-15 PROCEDURE — 36415 COLL VENOUS BLD VENIPUNCTURE: CPT

## 2022-10-16 DIAGNOSIS — E55.9 VITAMIN D DEFICIENCY: ICD-10-CM

## 2022-10-16 DIAGNOSIS — D50.9 IRON DEFICIENCY ANEMIA, UNSPECIFIED IRON DEFICIENCY ANEMIA TYPE: ICD-10-CM

## 2022-10-16 RX ORDER — FERROUS SULFATE 325(65) MG
325 TABLET ORAL
Qty: 45 TABLET | Refills: 0 | Status: SHIPPED | OUTPATIENT
Start: 2022-10-16 | End: 2022-11-16 | Stop reason: SDUPTHER

## 2022-10-16 RX ORDER — ERGOCALCIFEROL 1.25 MG/1
50000 CAPSULE ORAL
Qty: 8 CAPSULE | Refills: 0 | Status: SHIPPED | OUTPATIENT
Start: 2022-10-16 | End: 2023-04-06

## 2022-10-16 NOTE — PROGRESS NOTES
1. Iron deficiency anemia, unspecified iron deficiency anemia type   Latest Reference Range & Units 10/15/22 09:12   Hemoglobin 12.0 - 16.0 g/dL 11.2 (L)   Hematocrit 37.0 - 47.0 % 37.9   MCV 81.4 - 97.8 fL 72.7 (L)   MCH 27.0 - 33.0 pg 21.5 (L)   MCHC 33.6 - 35.0 g/dL 29.6 (L)      Latest Reference Range & Units 10/15/22 09:12   Iron 40 - 170 ug/dL 24 (L)   Total Iron Binding 250 - 450 ug/dL 459 (H)   % Saturation 15 - 55 % 5 (L)   Unsat Iron Binding 110 - 370 ug/dL 435 (H)   Recheck iron/CBC levels after 1 month supplementation     - ferrous sulfate 325 (65 Fe) MG tablet; Take 1 Tablet by mouth every 48 hours for 90 days.  Dispense: 45 Tablet; Refill: 0  - CBC WITH DIFFERENTIAL; Future  - IRON/TOTAL IRON BIND; Future  - FERRITIN; Future    2. Vitamin D deficiency   Latest Reference Range & Units 10/15/22 09:12   25-Hydroxy   Vitamin D 25 30 - 100 ng/mL 20 (L)   Supplement with 92511 units x 8 weeks and then get on daily OTC 2000 units vit D  - vitamin D2, Ergocalciferol, (DRISDOL) 1.25 MG (20500 UT) Cap capsule; Take 1 Capsule by mouth every 7 days.  Dispense: 8 Capsule; Refill: 0

## 2022-11-14 ENCOUNTER — HOSPITAL ENCOUNTER (OUTPATIENT)
Dept: LAB | Facility: MEDICAL CENTER | Age: 47
End: 2022-11-14
Attending: NURSE PRACTITIONER
Payer: COMMERCIAL

## 2022-11-14 DIAGNOSIS — D50.9 IRON DEFICIENCY ANEMIA, UNSPECIFIED IRON DEFICIENCY ANEMIA TYPE: ICD-10-CM

## 2022-11-14 LAB
ANISOCYTOSIS BLD QL SMEAR: ABNORMAL
BASOPHILS # BLD AUTO: 0.7 % (ref 0–1.8)
BASOPHILS # BLD: 0.04 K/UL (ref 0–0.12)
COMMENT 1642: NORMAL
EOSINOPHIL # BLD AUTO: 0.18 K/UL (ref 0–0.51)
EOSINOPHIL NFR BLD: 3.1 % (ref 0–6.9)
ERYTHROCYTE [DISTWIDTH] IN BLOOD BY AUTOMATED COUNT: 56.6 FL (ref 35.9–50)
FERRITIN SERPL-MCNC: 12.5 NG/ML (ref 10–291)
HCT VFR BLD AUTO: 40.7 % (ref 37–47)
HGB BLD-MCNC: 11.8 G/DL (ref 12–16)
IMM GRANULOCYTES # BLD AUTO: 0.01 K/UL (ref 0–0.11)
IMM GRANULOCYTES NFR BLD AUTO: 0.2 % (ref 0–0.9)
IRON SATN MFR SERPL: 6 % (ref 15–55)
IRON SERPL-MCNC: 27 UG/DL (ref 40–170)
LYMPHOCYTES # BLD AUTO: 2.81 K/UL (ref 1–4.8)
LYMPHOCYTES NFR BLD: 48.4 % (ref 22–41)
MCH RBC QN AUTO: 22.1 PG (ref 27–33)
MCHC RBC AUTO-ENTMCNC: 29 G/DL (ref 33.6–35)
MCV RBC AUTO: 76.4 FL (ref 81.4–97.8)
MICROCYTES BLD QL SMEAR: ABNORMAL
MONOCYTES # BLD AUTO: 0.55 K/UL (ref 0–0.85)
MONOCYTES NFR BLD AUTO: 9.5 % (ref 0–13.4)
MORPHOLOGY BLD-IMP: NORMAL
NEUTROPHILS # BLD AUTO: 2.22 K/UL (ref 2–7.15)
NEUTROPHILS NFR BLD: 38.1 % (ref 44–72)
NRBC # BLD AUTO: 0 K/UL
NRBC BLD-RTO: 0 /100 WBC
PLATELET # BLD AUTO: 299 K/UL (ref 164–446)
PLATELET BLD QL SMEAR: NORMAL
PMV BLD AUTO: 11.9 FL (ref 9–12.9)
RBC # BLD AUTO: 5.33 M/UL (ref 4.2–5.4)
RBC BLD AUTO: PRESENT
TIBC SERPL-MCNC: 454 UG/DL (ref 250–450)
UIBC SERPL-MCNC: 427 UG/DL (ref 110–370)
WBC # BLD AUTO: 5.8 K/UL (ref 4.8–10.8)

## 2022-11-14 PROCEDURE — 83550 IRON BINDING TEST: CPT

## 2022-11-14 PROCEDURE — 85025 COMPLETE CBC W/AUTO DIFF WBC: CPT

## 2022-11-14 PROCEDURE — 82728 ASSAY OF FERRITIN: CPT

## 2022-11-14 PROCEDURE — 36415 COLL VENOUS BLD VENIPUNCTURE: CPT

## 2022-11-14 PROCEDURE — 83540 ASSAY OF IRON: CPT

## 2022-11-16 DIAGNOSIS — D50.9 IRON DEFICIENCY ANEMIA, UNSPECIFIED IRON DEFICIENCY ANEMIA TYPE: ICD-10-CM

## 2022-11-16 RX ORDER — FERROUS SULFATE 325(65) MG
325 TABLET ORAL DAILY
Qty: 90 TABLET | Refills: 0 | Status: SHIPPED | OUTPATIENT
Start: 2022-11-16 | End: 2023-02-16

## 2022-11-16 NOTE — PROGRESS NOTES
1. Iron deficiency anemia, unspecified iron deficiency anemia type  Increase iron supplement from MWF to daily; recheck level in 6 weeks  - ferrous sulfate 325 (65 Fe) MG tablet; Take 1 Tablet by mouth every day.  Dispense: 90 Tablet; Refill: 0       Latest Reference Range & Units 10/15/22 09:12 11/14/22 08:39   WBC 4.8 - 10.8 K/uL 5.9 5.8   RBC 4.20 - 5.40 M/uL 5.21 5.33   Hemoglobin 12.0 - 16.0 g/dL 11.2 (L) 11.8 (L)   Hematocrit 37.0 - 47.0 % 37.9 40.7   MCV 81.4 - 97.8 fL 72.7 (L) 76.4 (L)   MCH 27.0 - 33.0 pg 21.5 (L) 22.1 (L)   MCHC 33.6 - 35.0 g/dL 29.6 (L) 29.0 (L)   RDW 35.9 - 50.0 fL 46.9 56.6 (H)   Platelet Count 164 - 446 K/uL 321 299   MPV 9.0 - 12.9 fL 11.1 11.9   Neutrophils-Polys 44.00 - 72.00 %  38.10 (L)   Neutrophils (Absolute) 2.00 - 7.15 K/uL  2.22   Lymphocytes 22.00 - 41.00 %  48.40 (H)   Lymphs (Absolute) 1.00 - 4.80 K/uL  2.81   Monocytes 0.00 - 13.40 %  9.50   Monos (Absolute) 0.00 - 0.85 K/uL  0.55   Eosinophils 0.00 - 6.90 %  3.10   Eos (Absolute) 0.00 - 0.51 K/uL  0.18   Basophils 0.00 - 1.80 %  0.70   Baso (Absolute) 0.00 - 0.12 K/uL  0.04   Immature Granulocytes 0.00 - 0.90 %  0.20   Immature Granulocytes (abs) 0.00 - 0.11 K/uL  0.01   Nucleated RBC /100 WBC  0.00   NRBC (Absolute) K/uL  0.00   Plt Estimation   Normal   RBC Morphology   Present   Anisocytosis   1+   Microcytosis   1+   Comments-Diff   see below   Peripheral Smear Review   see below   Sodium 135 - 145 mmol/L 138    Potassium 3.6 - 5.5 mmol/L 3.9    Chloride 96 - 112 mmol/L 104    Co2 20 - 33 mmol/L 23    Anion Gap 7.0 - 16.0  11.0    Glucose 65 - 99 mg/dL 95    Bun 8 - 22 mg/dL 9    Creatinine 0.50 - 1.40 mg/dL 0.61    GFR (CKD-EPI) >60 mL/min/1.73 m 2 110    Calcium 8.5 - 10.5 mg/dL 9.1    AST(SGOT) 12 - 45 U/L 40    ALT(SGPT) 2 - 50 U/L 40    Alkaline Phosphatase 30 - 99 U/L 72    Total Bilirubin 0.1 - 1.5 mg/dL 0.4    Albumin 3.2 - 4.9 g/dL 4.2    Total Protein 6.0 - 8.2 g/dL 8.1    Globulin 1.9 - 3.5 g/dL 3.9 (H)     A-G Ratio g/dL 1.1    Iron 40 - 170 ug/dL 24 (L) 27 (L)   Total Iron Binding 250 - 450 ug/dL 459 (H) 454 (H)   % Saturation 15 - 55 % 5 (L) 6 (L)   Unsat Iron Binding 110 - 370 ug/dL 435 (H) 427 (H)   (L): Data is abnormally low  (H): Data is abnormally high

## 2022-11-18 ENCOUNTER — OFFICE VISIT (OUTPATIENT)
Dept: MEDICAL GROUP | Facility: PHYSICIAN GROUP | Age: 47
End: 2022-11-18
Payer: COMMERCIAL

## 2022-11-18 VITALS
WEIGHT: 167 LBS | RESPIRATION RATE: 16 BRPM | OXYGEN SATURATION: 98 % | SYSTOLIC BLOOD PRESSURE: 116 MMHG | BODY MASS INDEX: 30.73 KG/M2 | HEART RATE: 83 BPM | DIASTOLIC BLOOD PRESSURE: 82 MMHG | TEMPERATURE: 97.5 F | HEIGHT: 62 IN

## 2022-11-18 DIAGNOSIS — Z97.5 IUD (INTRAUTERINE DEVICE) IN PLACE: ICD-10-CM

## 2022-11-18 DIAGNOSIS — Z30.09 ENCOUNTER FOR COUNSELING REGARDING CONTRACEPTION: ICD-10-CM

## 2022-11-18 PROCEDURE — 99213 OFFICE O/P EST LOW 20 MIN: CPT | Performed by: NURSE PRACTITIONER

## 2022-11-18 ASSESSMENT — FIBROSIS 4 INDEX: FIB4 SCORE: 1.02

## 2022-11-18 NOTE — ASSESSMENT & PLAN NOTE
Discussed options for contraception/pregnancy prevention. Answered questions regarding types of IUD (Mirena, Paragard).   - paper work submitted today to order Paragard IUD (per patient's preference);   - plan for PAP, IUD removal & then insertion of new IUD in about a month; confirm patient has received device before scheduling

## 2022-11-18 NOTE — PROGRESS NOTES
Subjective:     CC:   Chief Complaint   Patient presents with    Contraception     Wants to start IUD  PAPERWORK.         HPI:   Patient is a 47 y.o. established female patient with medical history listed below here today to discuss IUD replacement.     Problem   IUD (Intrauterine Device) in Place    M1; LMP-2022; spotted x5days  Copper IUD inserted 10 yrs ago for pregnancy prevention.  PAP 2018- NIL, -HPV  - She would like to get IUD replaced; preferably with copper IUD, if not available she is okay with Mirena.         Patient Active Problem List   Diagnosis    Throat swelling    Well woman exam with routine gynecological exam    Pain involving joints of fingers of both hands    Grade I hemorrhoids    Iron deficiency anemia    Positive OSWALDO (antinuclear antibody)    Upper respiratory symptom    IUD (intrauterine device) in place    Vitamin D deficiency    Obesity (BMI 30-39.9)       History reviewed. No pertinent past medical history.     Past Surgical History:   Procedure Laterality Date    PRIMARY C SECTION          Current Outpatient Medications on File Prior to Visit   Medication Sig Dispense Refill    ferrous sulfate 325 (65 Fe) MG tablet Take 1 Tablet by mouth every day. 90 Tablet 0    vitamin D2, Ergocalciferol, (DRISDOL) 1.25 MG (88424 UT) Cap capsule Take 1 Capsule by mouth every 7 days. 8 Capsule 0    albuterol 108 (90 Base) MCG/ACT Aero Soln inhalation aerosol Inhale 2 Puffs every four hours as needed for Shortness of Breath. 8 g 1     No current facility-administered medications on file prior to visit.      ROS:  Review of Systems   Constitutional: Negative.  Negative for fever and malaise/fatigue.   Respiratory:  Negative for cough, sputum production and shortness of breath.    Cardiovascular:  Negative for chest pain, palpitations and leg swelling.   Gastrointestinal: Negative.    Genitourinary: Negative.    Musculoskeletal: Negative.    Endo/Heme/Allergies: Negative.      Objective:  "    Exam:  /82   Pulse 83   Temp 36.4 °C (97.5 °F) (Tympanic)   Resp 16   Ht 1.575 m (5' 2\")   Wt 75.8 kg (167 lb)   SpO2 98%   BMI 30.54 kg/m²  Body mass index is 30.54 kg/m².    Physical Exam  Constitutional:       Appearance: Normal appearance.   Neurological:      General: No focal deficit present.      Mental Status: She is alert and oriented to person, place, and time.   Psychiatric:         Mood and Affect: Mood normal.         Behavior: Behavior normal.         Thought Content: Thought content normal.         Judgment: Judgment normal.       Assessment & Plan:     47 y.o. female with the following -     Problem List Items Addressed This Visit       IUD (intrauterine device) in place     Discussed options for contraception/pregnancy prevention. Answered questions regarding types of IUD (Mirena, Paragard).   - paper work submitted today to order Paragard IUD (per patient's preference);   - plan for PAP, IUD removal & then insertion of new IUD in about a month; confirm patient has received device before scheduling          Other Visit Diagnoses       Encounter for counseling regarding contraception                Return in about 4 weeks (around 12/16/2022) for PAP, IUD replacement.    I spent a total of 20 minutes with record review, exam, communication with the patient, communication with other providers, and documentation of this encounter.    Please note that this dictation was created using voice recognition software. I have made every reasonable attempt to correct obvious errors, but I expect that there are errors of grammar and possibly content that I did not discover before finalizing the note.        "

## 2022-11-19 ASSESSMENT — ENCOUNTER SYMPTOMS
SHORTNESS OF BREATH: 0
COUGH: 0
SPUTUM PRODUCTION: 0
PALPITATIONS: 0
FEVER: 0
GASTROINTESTINAL NEGATIVE: 1
MUSCULOSKELETAL NEGATIVE: 1
CONSTITUTIONAL NEGATIVE: 1

## 2022-12-10 ENCOUNTER — OFFICE VISIT (OUTPATIENT)
Dept: URGENT CARE | Facility: PHYSICIAN GROUP | Age: 47
End: 2022-12-10
Payer: COMMERCIAL

## 2022-12-10 VITALS
SYSTOLIC BLOOD PRESSURE: 138 MMHG | TEMPERATURE: 98.4 F | DIASTOLIC BLOOD PRESSURE: 92 MMHG | HEART RATE: 71 BPM | WEIGHT: 165 LBS | BODY MASS INDEX: 30.36 KG/M2 | OXYGEN SATURATION: 98 % | HEIGHT: 62 IN | RESPIRATION RATE: 14 BRPM

## 2022-12-10 DIAGNOSIS — J02.9 PHARYNGITIS, UNSPECIFIED ETIOLOGY: ICD-10-CM

## 2022-12-10 DIAGNOSIS — J02.9 SORE THROAT: ICD-10-CM

## 2022-12-10 LAB
INT CON NEG: NORMAL
INT CON POS: NORMAL
S PYO AG THROAT QL: NEGATIVE

## 2022-12-10 PROCEDURE — 87880 STREP A ASSAY W/OPTIC: CPT | Performed by: PHYSICIAN ASSISTANT

## 2022-12-10 PROCEDURE — 99213 OFFICE O/P EST LOW 20 MIN: CPT | Performed by: PHYSICIAN ASSISTANT

## 2022-12-10 RX ORDER — METHYLPREDNISOLONE 4 MG/1
TABLET ORAL
Qty: 21 TABLET | Refills: 0 | Status: SHIPPED | OUTPATIENT
Start: 2022-12-10 | End: 2023-04-06

## 2022-12-10 RX ORDER — CEFDINIR 300 MG/1
300 CAPSULE ORAL 2 TIMES DAILY
Qty: 20 CAPSULE | Refills: 0 | Status: SHIPPED | OUTPATIENT
Start: 2022-12-10 | End: 2022-12-20

## 2022-12-10 ASSESSMENT — ENCOUNTER SYMPTOMS
NAUSEA: 0
COUGH: 1
HEADACHES: 0
SORE THROAT: 1
EYE REDNESS: 0
DIARRHEA: 0
SHORTNESS OF BREATH: 1
FEVER: 1
TROUBLE SWALLOWING: 1
VOMITING: 0
EYE DISCHARGE: 0
MYALGIAS: 0

## 2022-12-10 ASSESSMENT — FIBROSIS 4 INDEX: FIB4 SCORE: 1.02

## 2022-12-10 NOTE — PROGRESS NOTES
Subjective     Jaswant Perdomo is a 47 y.o. female who presents with Sore Throat (Can't swallow saliva, cough, using inhaler more with some benefit, x10 days)            Pharyngitis   This is a new problem. Episode onset: x 10 days ago. The problem has been unchanged. Neither side of throat is experiencing more pain than the other. Maximum temperature: The patient reports an associated fever at the onset of symptoms, now resolved. The pain is moderate. Associated symptoms include congestion, coughing, a plugged ear sensation, shortness of breath (The patient reports intermittent shortness of breath. The patient reports a history of Asthma. The patient has been using her inhaler for her symptoms.) and trouble swallowing (The patient reports increased pain with swallowing.). Pertinent negatives include no diarrhea, drooling, ear pain, headaches or vomiting. She has had no exposure to strep. She has tried nothing for the symptoms.     The patient reports no recent sick contacts.  No known exposure to COVID-19.  No known exposure to influenza.    PMH:  has no past medical history of Asthma, Hyperlipidemia, or Hypertension.  MEDS:   Current Outpatient Medications:     ferrous sulfate 325 (65 Fe) MG tablet, Take 1 Tablet by mouth every day., Disp: 90 Tablet, Rfl: 0    albuterol 108 (90 Base) MCG/ACT Aero Soln inhalation aerosol, Inhale 2 Puffs every four hours as needed for Shortness of Breath., Disp: 8 g, Rfl: 1    vitamin D2, Ergocalciferol, (DRISDOL) 1.25 MG (52820 UT) Cap capsule, Take 1 Capsule by mouth every 7 days. (Patient not taking: Reported on 12/10/2022), Disp: 8 Capsule, Rfl: 0  ALLERGIES: No Known Allergies  SURGHX:   Past Surgical History:   Procedure Laterality Date    PRIMARY C SECTION       SOCHX:  reports that she has never smoked. She has never used smokeless tobacco. She reports that she does not drink alcohol and does not use drugs.  FH: Family history was reviewed, no pertinent findings to  "report      Review of Systems   Constitutional:  Positive for fever (now resolved).   HENT:  Positive for congestion, sore throat and trouble swallowing (The patient reports increased pain with swallowing.). Negative for drooling and ear pain.    Eyes:  Negative for discharge and redness.   Respiratory:  Positive for cough and shortness of breath (The patient reports intermittent shortness of breath. The patient reports a history of Asthma. The patient has been using her inhaler for her symptoms.).    Cardiovascular:  Negative for chest pain and leg swelling.   Gastrointestinal:  Negative for diarrhea, nausea and vomiting.   Musculoskeletal:  Negative for myalgias.   Skin:  Negative for rash.   Neurological:  Negative for headaches.            Objective     BP (!) 138/92   Pulse 71   Temp 36.9 °C (98.4 °F)   Resp 14   Ht 1.575 m (5' 2\")   Wt 74.8 kg (165 lb)   SpO2 98%   BMI 30.18 kg/m²      Physical Exam  Constitutional:       General: She is not in acute distress.     Appearance: Normal appearance. She is not ill-appearing.   HENT:      Head: Normocephalic and atraumatic.      Right Ear: Tympanic membrane, ear canal and external ear normal.      Left Ear: Tympanic membrane, ear canal and external ear normal.      Nose: Nose normal.      Mouth/Throat:      Mouth: Mucous membranes are moist.      Pharynx: Oropharynx is clear. Uvula midline. Posterior oropharyngeal erythema present.      Tonsils: No tonsillar exudate.   Eyes:      Extraocular Movements: Extraocular movements intact.      Conjunctiva/sclera: Conjunctivae normal.   Cardiovascular:      Rate and Rhythm: Normal rate and regular rhythm.      Heart sounds: Normal heart sounds.   Pulmonary:      Effort: Pulmonary effort is normal. No respiratory distress.      Breath sounds: Normal breath sounds. No wheezing.   Musculoskeletal:         General: Normal range of motion.      Cervical back: Normal range of motion and neck supple.   Skin:     General: " Skin is warm and dry.   Neurological:      Mental Status: She is alert and oriented to person, place, and time.             Progress:  POCT Rapid Strep: NEGATIVE               Assessment & Plan          1. Sore throat  - POCT Rapid Strep A    2. Pharyngitis, unspecified etiology  - cefdinir (OMNICEF) 300 MG Cap; Take 1 Capsule by mouth 2 times a day for 10 days.  Dispense: 20 Capsule; Refill: 0  - methylPREDNISolone (MEDROL DOSEPAK) 4 MG Tablet Therapy Pack; Follow schedule on package instructions.  Dispense: 21 Tablet; Refill: 0    The patient's presenting symptoms and physical exam findings are consistent with a sore throat likely secondary to acute pharyngitis.  On physical exam, the patient had erythema to the posterior pharynx without tonsil hypertrophy or exudates.  The patient's uvula was midline.  The remainder the patient's physical exam today in clinic was normal.  The patient is nontoxic and appears in no acute distress.  The patient's vital signs are stable and within normal limits.  She is afebrile today in clinic.  The patient's POCT rapid strep test today in clinic was negative.  Given the prolonged duration of the patient's symptoms, will prescribe the patient cefdinir for presumed pharyngitis.  We will also prescribe the patient a Medrol Dosepak for her current symptoms.  Advised the patient to monitor for worsening signs or symptoms.  Recommend OTC medications and supportive care for symptomatic management.  Recommend patient follow-up with PCP as needed.  Discussed return precautions with the patient, and she verbalized understanding.    Differential diagnoses, supportive care, and indications for immediate follow-up discussed with patient.   Instructed to return to clinic or nearest emergency department for any change in condition, further concerns, or worsening of symptoms.    OTC Tylenol or Motrin for fever/discomfort.  OTC cough/cold medication for symptomatic relief  OTC Supportive Care for  Sore Throat - warm salt water gargles, sore throat lozenges, warm lemon water, and/or tea.  Drink plenty of fluids  Follow-up with PCP   Return to clinic or go to the ED if symptoms worsen or fail to improve, or if patient should develop worsening/increasing sore throat, difficulty swallowing, drooling, change in voice, swollen glands, shortness of breath, ear pain, cough, congestion, fever/chills, and/or any concerning symptoms.    Discussed plan with the patient, and she agrees to the above.     I personally reviewed prior external notes and test results pertinent to today's visit.  I have independently reviewed and interpreted all diagnostics ordered during this urgent care visit.     Please note that this dictation was created using voice recognition software. I have made every reasonable attempt to correct obvious errors, but I expect that there may be errors of grammar and possibly content that I did not discover before finalizing the note.     This note was electronically signed by Jinny Baldwin PA-C

## 2023-02-16 DIAGNOSIS — D50.9 IRON DEFICIENCY ANEMIA, UNSPECIFIED IRON DEFICIENCY ANEMIA TYPE: ICD-10-CM

## 2023-02-16 RX ORDER — FERROUS SULFATE 325(65) MG
325 TABLET ORAL DAILY
Qty: 90 TABLET | Refills: 0 | Status: SHIPPED | OUTPATIENT
Start: 2023-02-16 | End: 2024-01-30

## 2023-04-03 SDOH — HEALTH STABILITY: PHYSICAL HEALTH: ON AVERAGE, HOW MANY MINUTES DO YOU ENGAGE IN EXERCISE AT THIS LEVEL?: 20 MIN

## 2023-04-03 SDOH — ECONOMIC STABILITY: HOUSING INSECURITY
IN THE LAST 12 MONTHS, WAS THERE A TIME WHEN YOU DID NOT HAVE A STEADY PLACE TO SLEEP OR SLEPT IN A SHELTER (INCLUDING NOW)?: NO

## 2023-04-03 SDOH — ECONOMIC STABILITY: FOOD INSECURITY: WITHIN THE PAST 12 MONTHS, YOU WORRIED THAT YOUR FOOD WOULD RUN OUT BEFORE YOU GOT MONEY TO BUY MORE.: NEVER TRUE

## 2023-04-03 SDOH — ECONOMIC STABILITY: FOOD INSECURITY: WITHIN THE PAST 12 MONTHS, THE FOOD YOU BOUGHT JUST DIDN'T LAST AND YOU DIDN'T HAVE MONEY TO GET MORE.: NEVER TRUE

## 2023-04-03 SDOH — ECONOMIC STABILITY: HOUSING INSECURITY: IN THE LAST 12 MONTHS, HOW MANY PLACES HAVE YOU LIVED?: 1

## 2023-04-03 SDOH — ECONOMIC STABILITY: INCOME INSECURITY: IN THE LAST 12 MONTHS, WAS THERE A TIME WHEN YOU WERE NOT ABLE TO PAY THE MORTGAGE OR RENT ON TIME?: NO

## 2023-04-03 SDOH — ECONOMIC STABILITY: TRANSPORTATION INSECURITY
IN THE PAST 12 MONTHS, HAS THE LACK OF TRANSPORTATION KEPT YOU FROM MEDICAL APPOINTMENTS OR FROM GETTING MEDICATIONS?: NO

## 2023-04-03 SDOH — ECONOMIC STABILITY: INCOME INSECURITY: HOW HARD IS IT FOR YOU TO PAY FOR THE VERY BASICS LIKE FOOD, HOUSING, MEDICAL CARE, AND HEATING?: NOT HARD AT ALL

## 2023-04-03 SDOH — ECONOMIC STABILITY: TRANSPORTATION INSECURITY
IN THE PAST 12 MONTHS, HAS LACK OF TRANSPORTATION KEPT YOU FROM MEETINGS, WORK, OR FROM GETTING THINGS NEEDED FOR DAILY LIVING?: NO

## 2023-04-03 SDOH — HEALTH STABILITY: PHYSICAL HEALTH: ON AVERAGE, HOW MANY DAYS PER WEEK DO YOU ENGAGE IN MODERATE TO STRENUOUS EXERCISE (LIKE A BRISK WALK)?: 2 DAYS

## 2023-04-03 ASSESSMENT — SOCIAL DETERMINANTS OF HEALTH (SDOH)
HOW OFTEN DO YOU GET TOGETHER WITH FRIENDS OR RELATIVES?: ONCE A WEEK
HOW OFTEN DO YOU ATTENT MEETINGS OF THE CLUB OR ORGANIZATION YOU BELONG TO?: PATIENT DECLINED
IN A TYPICAL WEEK, HOW MANY TIMES DO YOU TALK ON THE PHONE WITH FAMILY, FRIENDS, OR NEIGHBORS?: PATIENT DECLINED
DO YOU BELONG TO ANY CLUBS OR ORGANIZATIONS SUCH AS CHURCH GROUPS UNIONS, FRATERNAL OR ATHLETIC GROUPS, OR SCHOOL GROUPS?: NO
HOW OFTEN DO YOU ATTEND CHURCH OR RELIGIOUS SERVICES?: MORE THAN 4 TIMES PER YEAR

## 2023-04-03 ASSESSMENT — LIFESTYLE VARIABLES
HOW MANY STANDARD DRINKS CONTAINING ALCOHOL DO YOU HAVE ON A TYPICAL DAY: PATIENT DOES NOT DRINK
HOW OFTEN DO YOU HAVE SIX OR MORE DRINKS ON ONE OCCASION: NEVER
AUDIT-C TOTAL SCORE: 0
SKIP TO QUESTIONS 9-10: 1
HOW OFTEN DO YOU HAVE A DRINK CONTAINING ALCOHOL: NEVER

## 2023-04-06 ENCOUNTER — OFFICE VISIT (OUTPATIENT)
Dept: MEDICAL GROUP | Facility: PHYSICIAN GROUP | Age: 48
End: 2023-04-06
Payer: COMMERCIAL

## 2023-04-06 ENCOUNTER — HOSPITAL ENCOUNTER (OUTPATIENT)
Facility: MEDICAL CENTER | Age: 48
End: 2023-04-06
Attending: NURSE PRACTITIONER
Payer: COMMERCIAL

## 2023-04-06 DIAGNOSIS — Z12.4 CERVICAL CANCER SCREENING: ICD-10-CM

## 2023-04-06 DIAGNOSIS — T83.32XA INTRAUTERINE CONTRACEPTIVE DEVICE THREADS LOST, INITIAL ENCOUNTER: ICD-10-CM

## 2023-04-06 DIAGNOSIS — D50.8 OTHER IRON DEFICIENCY ANEMIA: ICD-10-CM

## 2023-04-06 DIAGNOSIS — Z97.5 IUD (INTRAUTERINE DEVICE) IN PLACE: ICD-10-CM

## 2023-04-06 DIAGNOSIS — Z01.419 WELL WOMAN EXAM WITH ROUTINE GYNECOLOGICAL EXAM: ICD-10-CM

## 2023-04-06 PROCEDURE — 88175 CYTOPATH C/V AUTO FLUID REDO: CPT

## 2023-04-06 PROCEDURE — 99396 PREV VISIT EST AGE 40-64: CPT | Performed by: NURSE PRACTITIONER

## 2023-04-06 PROCEDURE — 87624 HPV HI-RISK TYP POOLED RSLT: CPT

## 2023-04-06 SDOH — HEALTH STABILITY: PHYSICAL HEALTH: ON AVERAGE, HOW MANY DAYS PER WEEK DO YOU ENGAGE IN MODERATE TO STRENUOUS EXERCISE (LIKE A BRISK WALK)?: 2 DAYS

## 2023-04-06 SDOH — ECONOMIC STABILITY: HOUSING INSECURITY: IN THE LAST 12 MONTHS, HOW MANY PLACES HAVE YOU LIVED?: 1

## 2023-04-06 SDOH — ECONOMIC STABILITY: INCOME INSECURITY: IN THE LAST 12 MONTHS, WAS THERE A TIME WHEN YOU WERE NOT ABLE TO PAY THE MORTGAGE OR RENT ON TIME?: NO

## 2023-04-06 SDOH — ECONOMIC STABILITY: FOOD INSECURITY: WITHIN THE PAST 12 MONTHS, YOU WORRIED THAT YOUR FOOD WOULD RUN OUT BEFORE YOU GOT MONEY TO BUY MORE.: NEVER TRUE

## 2023-04-06 SDOH — HEALTH STABILITY: MENTAL HEALTH

## 2023-04-06 SDOH — ECONOMIC STABILITY: FOOD INSECURITY: WITHIN THE PAST 12 MONTHS, THE FOOD YOU BOUGHT JUST DIDN'T LAST AND YOU DIDN'T HAVE MONEY TO GET MORE.: NEVER TRUE

## 2023-04-06 SDOH — HEALTH STABILITY: PHYSICAL HEALTH: ON AVERAGE, HOW MANY MINUTES DO YOU ENGAGE IN EXERCISE AT THIS LEVEL?: 20 MIN

## 2023-04-06 SDOH — ECONOMIC STABILITY: TRANSPORTATION INSECURITY
IN THE PAST 12 MONTHS, HAS LACK OF RELIABLE TRANSPORTATION KEPT YOU FROM MEDICAL APPOINTMENTS, MEETINGS, WORK OR FROM GETTING THINGS NEEDED FOR DAILY LIVING?: NO

## 2023-04-06 SDOH — ECONOMIC STABILITY: INCOME INSECURITY: HOW HARD IS IT FOR YOU TO PAY FOR THE VERY BASICS LIKE FOOD, HOUSING, MEDICAL CARE, AND HEATING?: NOT HARD AT ALL

## 2023-04-06 ASSESSMENT — LIFESTYLE VARIABLES
HOW OFTEN DO YOU HAVE A DRINK CONTAINING ALCOHOL: NEVER
AUDIT-C TOTAL SCORE: 0
HOW OFTEN DO YOU HAVE SIX OR MORE DRINKS ON ONE OCCASION: NEVER
HOW MANY STANDARD DRINKS CONTAINING ALCOHOL DO YOU HAVE ON A TYPICAL DAY: PATIENT DOES NOT DRINK
SKIP TO QUESTIONS 9-10: 1

## 2023-04-06 ASSESSMENT — SOCIAL DETERMINANTS OF HEALTH (SDOH)
HOW OFTEN DO YOU HAVE A DRINK CONTAINING ALCOHOL: NEVER
DO YOU BELONG TO ANY CLUBS OR ORGANIZATIONS SUCH AS CHURCH GROUPS UNIONS, FRATERNAL OR ATHLETIC GROUPS, OR SCHOOL GROUPS?: NO
HOW HARD IS IT FOR YOU TO PAY FOR THE VERY BASICS LIKE FOOD, HOUSING, MEDICAL CARE, AND HEATING?: NOT HARD AT ALL
DO YOU BELONG TO ANY CLUBS OR ORGANIZATIONS SUCH AS CHURCH GROUPS UNIONS, FRATERNAL OR ATHLETIC GROUPS, OR SCHOOL GROUPS?: NO
HOW OFTEN DO YOU GET TOGETHER WITH FRIENDS OR RELATIVES?: ONCE A WEEK
HOW OFTEN DO YOU GET TOGETHER WITH FRIENDS OR RELATIVES?: ONCE A WEEK
HOW OFTEN DO YOU HAVE SIX OR MORE DRINKS ON ONE OCCASION: NEVER
HOW OFTEN DO YOU ATTEND CHURCH OR RELIGIOUS SERVICES?: MORE THAN 4 TIMES PER YEAR
HOW OFTEN DO YOU ATTEND CHURCH OR RELIGIOUS SERVICES?: MORE THAN 4 TIMES PER YEAR
IN A TYPICAL WEEK, HOW MANY TIMES DO YOU TALK ON THE PHONE WITH FAMILY, FRIENDS, OR NEIGHBORS?: PATIENT DECLINED
WITHIN THE PAST 12 MONTHS, YOU WORRIED THAT YOUR FOOD WOULD RUN OUT BEFORE YOU GOT THE MONEY TO BUY MORE: NEVER TRUE
HOW OFTEN DO YOU ATTENT MEETINGS OF THE CLUB OR ORGANIZATION YOU BELONG TO?: PATIENT DECLINED
HOW OFTEN DO YOU ATTENT MEETINGS OF THE CLUB OR ORGANIZATION YOU BELONG TO?: PATIENT DECLINED
HOW MANY DRINKS CONTAINING ALCOHOL DO YOU HAVE ON A TYPICAL DAY WHEN YOU ARE DRINKING: PATIENT DOES NOT DRINK
IN A TYPICAL WEEK, HOW MANY TIMES DO YOU TALK ON THE PHONE WITH FAMILY, FRIENDS, OR NEIGHBORS?: PATIENT DECLINED

## 2023-04-06 ASSESSMENT — PATIENT HEALTH QUESTIONNAIRE - PHQ9: CLINICAL INTERPRETATION OF PHQ2 SCORE: 0

## 2023-04-06 ASSESSMENT — FIBROSIS 4 INDEX: FIB4 SCORE: 1.02

## 2023-04-06 NOTE — PROGRESS NOTES
Subjective:     CC:   Chief Complaint   Patient presents with    Annual Exam     PAP SMEAR,        HPI:   47 y.o. female here today for annual exam. She is feeling well and denies any complaints.    Ob-Gyn/ History:    Patient has GYN provider: no  /Para:  M1  Last Pap Smear:  2018- NIL, -HPV. No history of abnormal pap smears.  Gyn Surgery:  none.  Current Contraceptive Method:  Copper IUD 10 years ago. Currently sexually active.  Last menstrual period:  2023 spotted x5days.  Periods regular. spotting bleeding. Cramping is mild.   She does not take OTC analgesics for cramps.  No significant bloating/fluid retention, pelvic pain, or dyspareunia. No vaginal discharge  Post-menopausal bleeding: n/a  Urinary incontinence: denies  Folate intake: on daily iron for deficiency anemia    Health Maintenance  Advanced directive: none at this time  Osteoporosis Screen/ DEXA: n/a; start at age 65  PT/vit D for falls prevention: low vit D, on daily supplements  Cholesterol Screening: TC//82 in 10/2022  Diabetes Screening: fasting glucose 95 in 10/2022  Aspirin Use: n/a  Diet: encouraged heart healthy eating. Avoid processed foods. Have adequate fiber  Exercise: recommend aerobic/strength training 3-5x/week  Substance Abuse: denies  Safe in relationship.   Seat belts discussed.  Sun protection used.    Cancer screening  Colorectal Cancer Screening: declined today; she will let me know when ready  Lung Cancer Screening: n/a; non smoker  Cervical Cancer Screenin, NIL, -ve HPV; repeat today  Breast Cancer Screenin normal; recall ordered today    Infectious disease screening/Immunizations  --STI Screening: declined  --Practices safe sex.  --HIV Screening: declined  --Hepatitis C Screening: declined  --Immunizations: up to date with vaccines  Immunization History   Administered Date(s) Administered    MODERNA SARS-COV-2 VACCINE (12+) 2021, 2021, 2021       She  has no past  medical history of Asthma, Hyperlipidemia, or Hypertension.  She  has a past surgical history that includes primary c section.    Family History   Problem Relation Age of Onset    Diabetes Mother     Heart Disease Mother     Hypertension Mother     Arthritis Mother         lupus    Thyroid Mother     No Known Problems Father     Thyroid Sister        Social History     Socioeconomic History    Marital status:      Spouse name: Not on file    Number of children: Not on file    Years of education: Not on file    Highest education level: 12th grade   Occupational History    Not on file   Tobacco Use    Smoking status: Never    Smokeless tobacco: Never   Vaping Use    Vaping Use: Never used   Substance and Sexual Activity    Alcohol use: No    Drug use: No    Sexual activity: Yes     Birth control/protection: I.U.D.     Comment: 2012   Other Topics Concern    Not on file   Social History Narrative    Not on file     Social Determinants of Health     Financial Resource Strain: Low Risk     Difficulty of Paying Living Expenses: Not hard at all   Food Insecurity: No Food Insecurity    Worried About Running Out of Food in the Last Year: Never true    Ran Out of Food in the Last Year: Never true   Transportation Needs: No Transportation Needs    Lack of Transportation (Medical): No    Lack of Transportation (Non-Medical): No   Physical Activity: Insufficiently Active    Days of Exercise per Week: 2 days    Minutes of Exercise per Session: 20 min   Stress: Not on file   Social Connections: Unknown    Frequency of Communication with Friends and Family: Patient refused    Frequency of Social Gatherings with Friends and Family: Once a week    Attends Buddhist Services: More than 4 times per year    Active Member of Clubs or Organizations: No    Attends Club or Organization Meetings: Patient refused    Marital Status:    Intimate Partner Violence: Not on file   Housing Stability: Low Risk     Unable to Pay for  "Housing in the Last Year: No    Number of Places Lived in the Last Year: 1    Unstable Housing in the Last Year: No       Patient Active Problem List    Diagnosis Date Noted    IUD (intrauterine device) in place 10/05/2022    Vitamin D deficiency 10/05/2022    Obesity (BMI 30-39.9) 10/05/2022    Upper respiratory symptom 11/30/2020    Iron deficiency anemia 12/11/2019    Positive OSWALDO (antinuclear antibody) 12/11/2019    Pain involving joints of fingers of both hands 12/02/2019    Grade I hemorrhoids 12/02/2019    Well woman exam with routine gynecological exam 12/10/2018    Throat swelling 02/24/2017         Current Outpatient Medications   Medication Sig Dispense Refill    ferrous sulfate 325 (65 Fe) MG tablet TAKE 1 TABLET BY MOUTH EVERY DAY 90 Tablet 0    albuterol 108 (90 Base) MCG/ACT Aero Soln inhalation aerosol Inhale 2 Puffs every four hours as needed for Shortness of Breath. 8 g 1    vitamin D2, Ergocalciferol, (DRISDOL) 1.25 MG (71240 UT) Cap capsule Take 1 Capsule by mouth every 7 days. (Patient not taking: Reported on 12/10/2022) 8 Capsule 0     No current facility-administered medications for this visit.     No Known Allergies    Review of Systems   Constitutional: Negative for fever, chills and malaise/fatigue.   HENT: Negative for congestion.    Eyes: Negative for pain.    Respiratory: Negative for cough and shortness of breath.  Cardiovascular: Negative for leg swelling.   Gastrointestinal: Negative for nausea, vomiting, abdominal pain and diarrhea.   Genitourinary: Negative for dysuria and hematuria.   Skin: Negative for rash.   Neurological: Negative for dizziness, focal weakness and headaches.   Endo/Heme/Allergies: Does not bleed easily.   Psychiatric/Behavioral: Negative for depression.  The patient is not nervous/anxious.      Objective:     Ht (P) 1.6 m (5' 3\")   Wt (P) 76.2 kg (168 lb)   BMI (P) 29.76 kg/m²   Body mass index is 29.76 kg/m² (pended).  Wt Readings from Last 4 Encounters: "   04/06/23 (P) 76.2 kg (168 lb)   12/10/22 74.8 kg (165 lb)   11/18/22 75.8 kg (167 lb)   10/05/22 76.2 kg (168 lb)       Physical Exam:  Constitutional: Well-developed and well-nourished. Not diaphoretic. No distress.   Skin: Skin is warm and dry. No rash noted.  Head: Atraumatic without lesions.  Eyes: Conjunctivae and extraocular motions are normal. Pupils are equal, round, and reactive to light. No scleral icterus.   Ears:  External ears unremarkable. Tympanic membranes clear and intact.  Nose: Nares patent. Septum midline. Turbinates without erythema nor edema. No discharge.   Mouth/Throat: Dentition is okay. Tongue normal. Oropharynx is clear and moist. Posterior pharynx without erythema or exudates.  Neck: Supple, trachea midline. Normal range of motion. No thyromegaly present. No lymphadenopathy--cervical or supraclavicular.  Cardiovascular: Regular rate and rhythm, S1 and S2 without murmur, rubs, or gallops.  Lungs: Normal inspiratory effort, CTA bilaterally, no wheezes/rhonchi/rales  Breast: Breasts examined seated and supine. No skin changes, peau d'orange or nipple retraction. No discharge. No axillary or supraclavicular adenopathy. No masses or nodularity palpable.   Abdomen: Soft, non tender, and without distention. Active bowel sounds in all four quadrants. No rebound, guarding, masses or HSM.  :Perineum and external genitalia normal without rash. Vagina with normal and physiologic discharge. Cervix without visible lesions or discharge. Bimanual exam without adnexal masses or cervical motion tenderness. Specimen collected from transformation zone; IUD strings were not visible  Extremities: No cyanosis, clubbing, erythema, nor edema. Distal pulses intact and symmetric.   Musculoskeletal: All major joints AROM full in all directions without pain.  Neurological: Alert and oriented x 3. DTRs 2+/3 and symmetric. No cranial nerve deficit. 5/5 myotomes. Sensation intact.   Psychiatric:  Behavior, mood, and  affect are appropriate.    A chaperone was offered to the patient during today's exam. Patient declined chaperone.    Assessment and Plan:   1. Intrauterine contraceptive device threads lost, initial encounter  Copper IUD inserted 10 yrs ago for pregnancy prevention.  IUD strings not visible during pap today; US ordered to verify if still in place.  Her paraguard IUD replacement was approved, some miscommunication and she thought she had co-pay. Follow up phone # provided today for her to confirm order when ready    - US-PELVIC COMPLETE (TRANSABDOMINAL/TRANSVAGINAL) (COMBO); Future    2. Other iron deficiency anemia   Latest Reference Range & Units 10/15/22 09:12 11/14/22 08:39   RBC 4.20 - 5.40 M/uL 5.21 5.33   Hemoglobin 12.0 - 16.0 g/dL 11.2 (L) 11.8 (L)   Hematocrit 37.0 - 47.0 % 37.9 40.7   MCV 81.4 - 97.8 fL 72.7 (L) 76.4 (L)   MCH 27.0 - 33.0 pg 21.5 (L) 22.1 (L)   MCHC 33.6 - 35.0 g/dL 29.6 (L) 29.0 (L)   RDW 35.9 - 50.0 fL 46.9 56.6 (H)   Platelet Count 164 - 446 K/uL 321 299      Latest Reference Range & Units 10/15/22 09:12 11/14/22 08:39   Iron 40 - 170 ug/dL 24 (L) 27 (L)   Total Iron Binding 250 - 450 ug/dL 459 (H) 454 (H)   % Saturation 15 - 55 % 5 (L) 6 (L)   Unsat Iron Binding 110 - 370 ug/dL 435 (H) 427 (H)     - continue daily iron supplement (has been on this since 11/2022). She will also add vit D, C, B12 (or MVI)  - recheck iron panel, CBC; will let her know results via MyChart  & refill iron supplement    3. Cervical cancer screening  - Thinprep Pap with HPV; Future    4. Well woman exam with routine gynecological exam  Normal pelvic/breast exam. IUD strings not visible, either migrated into uterus or fell out. Patient always had regular cycle with copper IUD, has not noticed any difference. She does want to continue with IUD for pregnancy prevention. Will US to confirm if still in place. Follow up with PAP results via [facility] Maintenance:     Labs per orders  Immunizations per  orders- none needed today  Patient counseled about skin care, diet, supplements, vitamins, safe sex and exercise.      Follow-up: Return in about 3 months (around 7/6/2023) for iron deficiency.

## 2023-04-06 NOTE — ASSESSMENT & PLAN NOTE
Latest Reference Range & Units 10/15/22 09:12 11/14/22 08:39   RBC 4.20 - 5.40 M/uL 5.21 5.33   Hemoglobin 12.0 - 16.0 g/dL 11.2 (L) 11.8 (L)   Hematocrit 37.0 - 47.0 % 37.9 40.7   MCV 81.4 - 97.8 fL 72.7 (L) 76.4 (L)   MCH 27.0 - 33.0 pg 21.5 (L) 22.1 (L)   MCHC 33.6 - 35.0 g/dL 29.6 (L) 29.0 (L)   RDW 35.9 - 50.0 fL 46.9 56.6 (H)   Platelet Count 164 - 446 K/uL 321 299      Latest Reference Range & Units 10/15/22 09:12 11/14/22 08:39   Iron 40 - 170 ug/dL 24 (L) 27 (L)   Total Iron Binding 250 - 450 ug/dL 459 (H) 454 (H)   % Saturation 15 - 55 % 5 (L) 6 (L)   Unsat Iron Binding 110 - 370 ug/dL 435 (H) 427 (H)     - continue daily iron supplement (has been on this since 11/2022). She will also add vit D, C, B12 (or MVI)  - recheck iron panel, CBC

## 2023-04-06 NOTE — ASSESSMENT & PLAN NOTE
IUD strings not visible during pap today; US ordered to verify if still in place.  Her paraguard IUD replacement was approved, some miscommunication and she thought she had co-pay. Follow up phone # provided today for her to confirm order when ready

## 2023-04-13 VITALS — BODY MASS INDEX: 29.77 KG/M2 | HEIGHT: 63 IN | WEIGHT: 168 LBS

## 2023-04-15 ENCOUNTER — HOSPITAL ENCOUNTER (OUTPATIENT)
Dept: LAB | Facility: MEDICAL CENTER | Age: 48
End: 2023-04-15
Attending: NURSE PRACTITIONER
Payer: COMMERCIAL

## 2023-04-15 DIAGNOSIS — D50.9 IRON DEFICIENCY ANEMIA, UNSPECIFIED IRON DEFICIENCY ANEMIA TYPE: ICD-10-CM

## 2023-04-15 LAB
BASOPHILS # BLD AUTO: 0.7 % (ref 0–1.8)
BASOPHILS # BLD: 0.04 K/UL (ref 0–0.12)
EOSINOPHIL # BLD AUTO: 0.2 K/UL (ref 0–0.51)
EOSINOPHIL NFR BLD: 3.5 % (ref 0–6.9)
ERYTHROCYTE [DISTWIDTH] IN BLOOD BY AUTOMATED COUNT: 45.1 FL (ref 35.9–50)
FERRITIN SERPL-MCNC: 29.4 NG/ML (ref 10–291)
HCT VFR BLD AUTO: 46.1 % (ref 37–47)
HGB BLD-MCNC: 14.7 G/DL (ref 12–16)
IMM GRANULOCYTES # BLD AUTO: 0.01 K/UL (ref 0–0.11)
IMM GRANULOCYTES NFR BLD AUTO: 0.2 % (ref 0–0.9)
IRON SATN MFR SERPL: 27 % (ref 15–55)
IRON SERPL-MCNC: 102 UG/DL (ref 40–170)
LYMPHOCYTES # BLD AUTO: 2.69 K/UL (ref 1–4.8)
LYMPHOCYTES NFR BLD: 47.3 % (ref 22–41)
MCH RBC QN AUTO: 28.3 PG (ref 27–33)
MCHC RBC AUTO-ENTMCNC: 31.9 G/DL (ref 33.6–35)
MCV RBC AUTO: 88.8 FL (ref 81.4–97.8)
MONOCYTES # BLD AUTO: 0.54 K/UL (ref 0–0.85)
MONOCYTES NFR BLD AUTO: 9.5 % (ref 0–13.4)
NEUTROPHILS # BLD AUTO: 2.21 K/UL (ref 2–7.15)
NEUTROPHILS NFR BLD: 38.8 % (ref 44–72)
NRBC # BLD AUTO: 0 K/UL
NRBC BLD-RTO: 0 /100 WBC
PLATELET # BLD AUTO: 201 K/UL (ref 164–446)
PMV BLD AUTO: 12.7 FL (ref 9–12.9)
RBC # BLD AUTO: 5.19 M/UL (ref 4.2–5.4)
TIBC SERPL-MCNC: 377 UG/DL (ref 250–450)
UIBC SERPL-MCNC: 275 UG/DL (ref 110–370)
WBC # BLD AUTO: 5.7 K/UL (ref 4.8–10.8)

## 2023-04-15 PROCEDURE — 85025 COMPLETE CBC W/AUTO DIFF WBC: CPT

## 2023-04-15 PROCEDURE — 83540 ASSAY OF IRON: CPT

## 2023-04-15 PROCEDURE — 82728 ASSAY OF FERRITIN: CPT

## 2023-04-15 PROCEDURE — 36415 COLL VENOUS BLD VENIPUNCTURE: CPT

## 2023-04-15 PROCEDURE — 83550 IRON BINDING TEST: CPT

## 2023-05-01 ENCOUNTER — HOSPITAL ENCOUNTER (OUTPATIENT)
Dept: RADIOLOGY | Facility: MEDICAL CENTER | Age: 48
End: 2023-05-01
Attending: NURSE PRACTITIONER
Payer: COMMERCIAL

## 2023-05-01 DIAGNOSIS — T83.32XA INTRAUTERINE CONTRACEPTIVE DEVICE THREADS LOST, INITIAL ENCOUNTER: ICD-10-CM

## 2023-05-01 PROCEDURE — 76830 TRANSVAGINAL US NON-OB: CPT

## 2023-05-05 ENCOUNTER — OFFICE VISIT (OUTPATIENT)
Dept: MEDICAL GROUP | Facility: PHYSICIAN GROUP | Age: 48
End: 2023-05-05
Payer: COMMERCIAL

## 2023-05-05 VITALS
SYSTOLIC BLOOD PRESSURE: 110 MMHG | BODY MASS INDEX: 29.77 KG/M2 | RESPIRATION RATE: 14 BRPM | WEIGHT: 168 LBS | DIASTOLIC BLOOD PRESSURE: 78 MMHG | HEIGHT: 63 IN | TEMPERATURE: 97.4 F | OXYGEN SATURATION: 98 % | HEART RATE: 84 BPM

## 2023-05-05 DIAGNOSIS — G51.0 BELL'S PALSY: ICD-10-CM

## 2023-05-05 DIAGNOSIS — G43.011 INTRACTABLE MIGRAINE WITHOUT AURA AND WITH STATUS MIGRAINOSUS: ICD-10-CM

## 2023-05-05 PROCEDURE — 99214 OFFICE O/P EST MOD 30 MIN: CPT | Performed by: NURSE PRACTITIONER

## 2023-05-05 RX ORDER — SUMATRIPTAN 100 MG/1
100 TABLET, FILM COATED ORAL
Qty: 10 TABLET | Refills: 0 | Status: SHIPPED | OUTPATIENT
Start: 2023-05-05 | End: 2023-05-12 | Stop reason: SDUPTHER

## 2023-05-05 RX ORDER — VALACYCLOVIR HYDROCHLORIDE 1 G/1
1000 TABLET, FILM COATED ORAL 3 TIMES DAILY
Qty: 21 TABLET | Refills: 0 | Status: SHIPPED | OUTPATIENT
Start: 2023-05-05 | End: 2023-05-12

## 2023-05-05 RX ORDER — ERYTHROMYCIN 5 MG/G
1 OINTMENT OPHTHALMIC
Qty: 3.5 G | Refills: 0 | Status: CANCELLED | OUTPATIENT
Start: 2023-05-05

## 2023-05-05 RX ORDER — PREDNISONE 20 MG/1
TABLET ORAL
Qty: 22 TABLET | Refills: 0 | Status: SHIPPED | OUTPATIENT
Start: 2023-05-05 | End: 2023-05-26

## 2023-05-05 ASSESSMENT — FIBROSIS 4 INDEX: FIB4 SCORE: 1.51

## 2023-05-05 NOTE — PROGRESS NOTES
"Chief Complaint   Patient presents with    Headache     X3days, Noticed redness, swelling, watery in Rt eye yesterday.       HISTORY OF PRESENT ILLNESS: Alis ROMERO LindseyEugenio is a 47 y.o. female established patient who presents today to discuss:  - reports she developed right side headache on Tuesday 5/2/2023; she took ibuprofen 800mg, tylenol; headache subsided just a bit  - yesterday 5/4/2023 she noticed her right eye was watery; then this morning she woke up and noticed droop in her right lip  - she endorses a history of infrequent migraines in the past; she last took prescription medication for migraine years ago (recalls was dissolvable under the tongue but unsure name of med).       Current Outpatient Medications on File Prior to Visit   Medication Sig Dispense Refill    ferrous sulfate 325 (65 Fe) MG tablet TAKE 1 TABLET BY MOUTH EVERY DAY 90 Tablet 0    albuterol 108 (90 Base) MCG/ACT Aero Soln inhalation aerosol Inhale 2 Puffs every four hours as needed for Shortness of Breath. 8 g 1     No current facility-administered medications on file prior to visit.       has no past medical history on file.     Patient Active Problem List   Diagnosis    Throat swelling    Well woman exam with routine gynecological exam    Pain involving joints of fingers of both hands    Grade I hemorrhoids    Iron deficiency anemia    Positive OSWALDO (antinuclear antibody)    Upper respiratory symptom    IUD (intrauterine device) in place    Vitamin D deficiency    Obesity (BMI 30-39.9)    Bell's palsy    Intractable migraine without aura and with status migrainosus        Allergies:Patient has no allergy information on record.    Health Maintenance: deferred  Review of Systems -included above  Exam:   /78 (BP Location: Right arm, Patient Position: Sitting, BP Cuff Size: Adult)   Pulse 84   Temp 36.3 °C (97.4 °F) (Temporal)   Resp 14   Ht 1.6 m (5' 3\")   Wt 76.2 kg (168 lb)   SpO2 98%   Body mass index is 29.76 " kg/m².   Physical Exam  HENT:      Head: Normocephalic and atraumatic.      Jaw: There is normal jaw occlusion.      Right Ear: Hearing, tympanic membrane, ear canal and external ear normal.      Left Ear: Hearing, tympanic membrane, ear canal and external ear normal.      Nose: Nose normal.      Right Turbinates: Not enlarged.      Left Turbinates: Not enlarged.      Right Sinus: No maxillary sinus tenderness or frontal sinus tenderness.      Left Sinus: No maxillary sinus tenderness or frontal sinus tenderness.   Eyes:      General: Vision grossly intact. Gaze aligned appropriately.      Extraocular Movements: Extraocular movements intact.      Conjunctiva/sclera: Conjunctivae normal.      Comments: Right eyelid - not achieving complete closure when she blinks or closes eyes   Cardiovascular:      Rate and Rhythm: Normal rate and regular rhythm.   Pulmonary:      Effort: Pulmonary effort is normal.      Breath sounds: Normal breath sounds.   Musculoskeletal:         General: Normal range of motion.      Cervical back: Normal range of motion and neck supple.      Right lower leg: No edema.      Left lower leg: No edema.   Skin:     General: Skin is warm and dry.   Neurological:      Mental Status: She is alert and oriented to person, place, and time.      GCS: GCS eye subscore is 4. GCS verbal subscore is 5. GCS motor subscore is 6.      Cranial Nerves: Facial asymmetry (droop right corner lips; incomplete closure of right eye lid) present. No dysarthria.      Sensory: Sensation is intact.      Motor: Motor function is intact.      Coordination: Coordination is intact.      Gait: Gait is intact.       Assessment/Plan:  1. Bell's palsy  2. Intractable migraine without aura and with status migrainosus  Acute onset of right facial weakness (specifically eye lid, corner of lip) this morning. Persisting right head migraine the past 3 days prior to start of symptoms, most likely trigger. Usually takes ibuprofen for her  migraines, did not get complete relief this time around. No changes in vision, speech, gait or mental status. Denies dizziness, vertigo, CP.   - start tapered treatment with oral prednisone; adding 7 day valcyclovir considering above presenting symptoms  - OTC eye lubricating drops for incomplete closure of right eye lid  - start sumatriptan today for migraine headache. Can repeat dose tomorrow if needed  - CT imaging, neuro referral if no improvement at follow up next week    - valacyclovir (VALTREX) 1 GM Tab; Take 1 Tablet by mouth 3 times a day for 7 days.  Dispense: 21 Tablet; Refill: 0  - predniSONE (DELTASONE) 20 MG Tab; Take 60mg daily x5 days; 40mg x3 days, 20mg x3 days, 10mg x2 days.  Dispense: 22 Tablet; Refill: 0  - sumatriptan (IMITREX) 100 MG tablet; Take 1 Tablet by mouth one time as needed for Migraine for up to 1 dose.  Dispense: 10 Tablet; Refill: 0     Follow up:  Return in about 1 week (around 5/12/2023) for migraine, bells palsy.    Educated in proper administration of medication(s) ordered today including safety, possible SE, risks, benefits, rationale and alternatives to therapy.       Please note that this dictation was created using voice recognition software. I have made every reasonable attempt to correct obvious errors, but I expect that there are errors of grammar and possibly content that I did not discover before finalizing the note.

## 2023-05-07 ASSESSMENT — VISUAL ACUITY: OU: 1

## 2023-05-07 NOTE — ASSESSMENT & PLAN NOTE
Acute onset of right facial weakness (specifically eye lid, corner of lips) this morning. Persisting right head migraine the past 3 days prior to start of symptoms, most likely trigger. Usually takes ibuprofen for her migraines, did not get complete relief this time around. No changes in vision, speech, gait or mental status. Denies dizziness, vertigo, CP.   - start tapered treatment with oral prednisone; adding 7 day valcyclovir considering above presenting symptoms  - OTC eye lubricating drops for incomplete closure of right eye lid  - start sumatriptan today for migraine headache. Can repeat dose tomorrow if needed  - CT imaging, neuro referral if no improvement at follow up next week

## 2023-05-11 RX ORDER — COPPER 313.4 MG/1
1 INTRAUTERINE DEVICE INTRAUTERINE ONCE
Qty: 1 EACH | Refills: 0 | Status: SHIPPED
Start: 2023-05-11 | End: 2023-05-11

## 2023-05-12 ENCOUNTER — OFFICE VISIT (OUTPATIENT)
Dept: MEDICAL GROUP | Facility: PHYSICIAN GROUP | Age: 48
End: 2023-05-12
Payer: COMMERCIAL

## 2023-05-12 VITALS
DIASTOLIC BLOOD PRESSURE: 84 MMHG | BODY MASS INDEX: 29.95 KG/M2 | HEART RATE: 78 BPM | TEMPERATURE: 98.2 F | HEIGHT: 63 IN | OXYGEN SATURATION: 98 % | SYSTOLIC BLOOD PRESSURE: 126 MMHG | WEIGHT: 169 LBS

## 2023-05-12 DIAGNOSIS — G51.0 BELL'S PALSY: ICD-10-CM

## 2023-05-12 DIAGNOSIS — G43.011 INTRACTABLE MIGRAINE WITHOUT AURA AND WITH STATUS MIGRAINOSUS: ICD-10-CM

## 2023-05-12 PROCEDURE — 99214 OFFICE O/P EST MOD 30 MIN: CPT | Performed by: NURSE PRACTITIONER

## 2023-05-12 PROCEDURE — 3079F DIAST BP 80-89 MM HG: CPT | Performed by: NURSE PRACTITIONER

## 2023-05-12 PROCEDURE — 3074F SYST BP LT 130 MM HG: CPT | Performed by: NURSE PRACTITIONER

## 2023-05-12 RX ORDER — SUMATRIPTAN 100 MG/1
100 TABLET, FILM COATED ORAL
Qty: 10 TABLET | Refills: 1 | Status: SHIPPED | OUTPATIENT
Start: 2023-05-12 | End: 2023-06-01

## 2023-05-12 ASSESSMENT — ENCOUNTER SYMPTOMS
FEVER: 0
EYES NEGATIVE: 1
COUGH: 0
SEIZURES: 0
PALPITATIONS: 0
HEADACHES: 1
FOCAL WEAKNESS: 1
MUSCULOSKELETAL NEGATIVE: 1
SPUTUM PRODUCTION: 0
DIZZINESS: 0
SENSORY CHANGE: 0
SHORTNESS OF BREATH: 0
WEAKNESS: 0
SPEECH CHANGE: 0
LOSS OF CONSCIOUSNESS: 0
CONSTITUTIONAL NEGATIVE: 1

## 2023-05-12 ASSESSMENT — FIBROSIS 4 INDEX: FIB4 SCORE: 1.51

## 2023-05-12 ASSESSMENT — VISUAL ACUITY: OU: 1

## 2023-05-12 NOTE — PROGRESS NOTES
Subjective       CC:   Chief Complaint   Patient presents with    Follow-Up        HPI:   Patient is a 47 y.o. established female patient with medical history listed below here today for follow up on bell's palsy. She had acute onset of right face paralysis last week 5/7/2023, precipitated by persisting right side migraine headache. I saw her same day in clinic and started her on tapered prednisone with valacyclovir. She returns today for 1 week follow up, no improvement in symptoms. Incomplete closure of right eye lid is more pronounced today. Headache subsided with sumatriptan. She is completing steroid and valacyclovir today. We will send her for MRI imaging and place referral for neurology today.     Problem   Bell's Palsy   Intractable Migraine Without Aura and With Status Migrainosus       Patient Active Problem List   Diagnosis    Throat swelling    Well woman exam with routine gynecological exam    Pain involving joints of fingers of both hands    Grade I hemorrhoids    Iron deficiency anemia    Positive OSWALDO (antinuclear antibody)    Upper respiratory symptom    IUD (intrauterine device) in place    Vitamin D deficiency    Obesity (BMI 30-39.9)    Bell's palsy    Intractable migraine without aura and with status migrainosus       No past medical history on file.     Past Surgical History:   Procedure Laterality Date    PRIMARY C SECTION          Current Outpatient Medications on File Prior to Visit   Medication Sig Dispense Refill    valacyclovir (VALTREX) 1 GM Tab Take 1 Tablet by mouth 3 times a day for 7 days. 21 Tablet 0    predniSONE (DELTASONE) 20 MG Tab Take 60mg daily x5 days; 40mg x3 days, 20mg x3 days, 10mg x2 days. 22 Tablet 0    ferrous sulfate 325 (65 Fe) MG tablet TAKE 1 TABLET BY MOUTH EVERY DAY 90 Tablet 0    albuterol 108 (90 Base) MCG/ACT Aero Soln inhalation aerosol Inhale 2 Puffs every four hours as needed for Shortness of Breath. 8 g 1     No current facility-administered medications on  "file prior to visit.        ROS:  Review of Systems   Constitutional: Negative.  Negative for fever and malaise/fatigue.   HENT: Negative.     Eyes: Negative.    Respiratory:  Negative for cough, sputum production and shortness of breath.    Cardiovascular:  Negative for chest pain, palpitations and leg swelling.   Musculoskeletal: Negative.    Skin: Negative.    Neurological:  Positive for focal weakness and headaches. Negative for dizziness, sensory change, speech change, seizures, loss of consciousness and weakness.       Objective       Exam:  /84   Pulse 78   Temp 36.8 °C (98.2 °F) (Temporal)   Ht 1.6 m (5' 3\")   Wt 76.7 kg (169 lb)   LMP 05/03/2023 (Exact Date)   SpO2 98%   BMI 29.94 kg/m²  Body mass index is 29.94 kg/m².    Physical Exam  HENT:      Head: Normocephalic and atraumatic.      Jaw: There is normal jaw occlusion.      Right Ear: Hearing, tympanic membrane, ear canal and external ear normal.      Left Ear: Hearing, tympanic membrane, ear canal and external ear normal.      Nose: Nose normal.      Right Turbinates: Not enlarged.      Left Turbinates: Not enlarged.      Right Sinus: No maxillary sinus tenderness or frontal sinus tenderness.      Left Sinus: No maxillary sinus tenderness or frontal sinus tenderness.   Eyes:      General: Vision grossly intact. Gaze aligned appropriately.      Extraocular Movements: Extraocular movements intact.      Conjunctiva/sclera: Conjunctivae normal.      Comments: Right eyelid - not achieving complete closure when she blinks or closes eyes   Cardiovascular:      Rate and Rhythm: Normal rate and regular rhythm.   Pulmonary:      Effort: Pulmonary effort is normal.      Breath sounds: Normal breath sounds.   Musculoskeletal:         General: Normal range of motion.      Cervical back: Normal range of motion and neck supple.      Right lower leg: No edema.      Left lower leg: No edema.   Skin:     General: Skin is warm and dry.   Neurological:      " Mental Status: She is alert and oriented to person, place, and time.      GCS: GCS eye subscore is 4. GCS verbal subscore is 5. GCS motor subscore is 6.      Cranial Nerves: Facial asymmetry (droop right corner lips; incomplete closure of right eye lid) present. No dysarthria.      Sensory: Sensation is intact.      Motor: Motor function is intact.      Coordination: Coordination is intact.      Gait: Gait is intact.         Assessment & Plan       47 y.o. female with the following -     Problem List Items Addressed This Visit       Bell's palsy     HPI from 5/5/2023- Acute onset of right facial weakness (specifically eye lid, corner of lips) this morning. Persisting right head migraine the past 3 days prior to start of symptoms, most likely trigger. Usually takes ibuprofen for her migraines, did not get complete relief this time around. No changes in vision, speech, gait or mental status. Denies dizziness, vertigo, CP.   - start tapered treatment with oral prednisone; adding 7 day valcyclovir considering above presenting symptoms  - OTC eye lubricating drops for incomplete closure of right eye lid  - start sumatriptan today for migraine headache. Can repeat dose tomorrow if needed  - CT imaging, neuro referral if no improvement at follow up next week  ----  5/12/2023 visit - no improvement in symptoms; incomplete closure of right eye lid and corner of right lip droop is more pronounced today. Right cheek has mild ache. States migraine headache subsided with sumatriptan.   - MRI head ordered today  - referral for her to see neurology for further evaluation & treatment  - continue with prn sumatriptan for migraine  - lubricating eye drops in the day time; lubricating eye ointment at night, close right eyelid & cover with gauze/tape to keep shut over night.            Relevant Orders    Referral to Neurology    MR-BRAIN-WITH & W/O    MR-BRAIN-WITH & W/O    Intractable migraine without aura and with status migrainosus      HPI from 5/5/2023 visit- Acute onset of right facial weakness (specifically eye lid, corner of lips) this morning. Persisting right head migraine the past 3 days prior to start of symptoms, most likely trigger. Usually takes ibuprofen for her migraines, did not get complete relief this time around. No changes in vision, speech, gait or mental status. Denies dizziness, vertigo, CP.   - start tapered treatment with oral prednisone; adding 7 day valcyclovir considering above presenting symptoms  - OTC eye lubricating drops for incomplete closure of right eye lid  - start sumatriptan today for migraine headache. Can repeat dose tomorrow if needed  - CT imaging, neuro referral if no improvement at follow up next week  ----  5/12/2023 - migraine resolved with use of sumatriptan use over several nights. Has mild headaches in the morning and is taking advil for this  - MRI brain ordered today  - facial paralysis did not improve. Referral placed for neurology referral           Relevant Medications    sumatriptan (IMITREX) 100 MG tablet    Other Relevant Orders    Referral to Neurology    MR-BRAIN-WITH & W/O    MR-BRAIN-WITH & W/O     Educated in proper administration of medication(s) ordered today including safety, possible SE, risks, benefits, rationale and alternatives to therapy.     Return in about 2 weeks (around 5/26/2023) for facial paralysis.    Please note that this dictation was created using voice recognition software. I have made every reasonable attempt to correct obvious errors, but I expect that there are errors of grammar and possibly content that I did not discover before finalizing the note.

## 2023-05-13 NOTE — ASSESSMENT & PLAN NOTE
HPI from 5/5/2023- Acute onset of right facial weakness (specifically eye lid, corner of lips) this morning. Persisting right head migraine the past 3 days prior to start of symptoms, most likely trigger. Usually takes ibuprofen for her migraines, did not get complete relief this time around. No changes in vision, speech, gait or mental status. Denies dizziness, vertigo, CP.   - start tapered treatment with oral prednisone; adding 7 day valcyclovir considering above presenting symptoms  - OTC eye lubricating drops for incomplete closure of right eye lid  - start sumatriptan today for migraine headache. Can repeat dose tomorrow if needed  - CT imaging, neuro referral if no improvement at follow up next week  ----  5/12/2023 visit - no improvement in symptoms; incomplete closure of right eye lid and corner of right lip droop is more pronounced today. Right cheek has mild ache. States migraine headache subsided with sumatriptan.   - MRI head ordered today  - referral for her to see neurology for further evaluation & treatment  - continue with prn sumatriptan for migraine  - lubricating eye drops in the day time; lubricating eye ointment at night, close right eyelid & cover with gauze/tape to keep shut over night.

## 2023-05-13 NOTE — ASSESSMENT & PLAN NOTE
HPI from 5/5/2023 visit- Acute onset of right facial weakness (specifically eye lid, corner of lips) this morning. Persisting right head migraine the past 3 days prior to start of symptoms, most likely trigger. Usually takes ibuprofen for her migraines, did not get complete relief this time around. No changes in vision, speech, gait or mental status. Denies dizziness, vertigo, CP.   - start tapered treatment with oral prednisone; adding 7 day valcyclovir considering above presenting symptoms  - OTC eye lubricating drops for incomplete closure of right eye lid  - start sumatriptan today for migraine headache. Can repeat dose tomorrow if needed  - CT imaging, neuro referral if no improvement at follow up next week  ----  5/12/2023 - migraine resolved with use of sumatriptan use over several nights. Has mild headaches in the morning and is taking advil for this  - MRI brain ordered today  - facial paralysis did not improve. Referral placed for neurology referral

## 2023-05-16 ENCOUNTER — HOSPITAL ENCOUNTER (OUTPATIENT)
Dept: RADIOLOGY | Facility: MEDICAL CENTER | Age: 48
End: 2023-05-16
Attending: NURSE PRACTITIONER
Payer: COMMERCIAL

## 2023-05-16 DIAGNOSIS — G51.0 BELL'S PALSY: ICD-10-CM

## 2023-05-16 DIAGNOSIS — G43.011 INTRACTABLE MIGRAINE WITHOUT AURA AND WITH STATUS MIGRAINOSUS: ICD-10-CM

## 2023-05-16 PROCEDURE — 70553 MRI BRAIN STEM W/O & W/DYE: CPT

## 2023-05-16 PROCEDURE — A9579 GAD-BASE MR CONTRAST NOS,1ML: HCPCS | Performed by: NURSE PRACTITIONER

## 2023-05-16 PROCEDURE — 700117 HCHG RX CONTRAST REV CODE 255: Performed by: NURSE PRACTITIONER

## 2023-05-16 RX ADMIN — GADOTERIDOL 15 ML: 279.3 INJECTION, SOLUTION INTRAVENOUS at 11:29

## 2023-05-26 ENCOUNTER — OFFICE VISIT (OUTPATIENT)
Dept: MEDICAL GROUP | Facility: PHYSICIAN GROUP | Age: 48
End: 2023-05-26
Payer: COMMERCIAL

## 2023-05-26 VITALS
TEMPERATURE: 97 F | WEIGHT: 167 LBS | BODY MASS INDEX: 29.58 KG/M2 | OXYGEN SATURATION: 98 % | SYSTOLIC BLOOD PRESSURE: 116 MMHG | DIASTOLIC BLOOD PRESSURE: 80 MMHG | HEART RATE: 80 BPM

## 2023-05-26 DIAGNOSIS — G51.0 BELL'S PALSY: ICD-10-CM

## 2023-05-26 PROCEDURE — 99214 OFFICE O/P EST MOD 30 MIN: CPT | Performed by: NURSE PRACTITIONER

## 2023-05-26 PROCEDURE — 3074F SYST BP LT 130 MM HG: CPT | Performed by: NURSE PRACTITIONER

## 2023-05-26 PROCEDURE — 3079F DIAST BP 80-89 MM HG: CPT | Performed by: NURSE PRACTITIONER

## 2023-05-26 ASSESSMENT — ENCOUNTER SYMPTOMS
PALPITATIONS: 0
SENSORY CHANGE: 0
CONSTITUTIONAL NEGATIVE: 1
MUSCULOSKELETAL NEGATIVE: 1
EYES NEGATIVE: 1
HEADACHES: 1
DIZZINESS: 0
LOSS OF CONSCIOUSNESS: 0
SEIZURES: 0
WEAKNESS: 0
FEVER: 0
SPEECH CHANGE: 0
FOCAL WEAKNESS: 1
SHORTNESS OF BREATH: 0
COUGH: 0
SPUTUM PRODUCTION: 0

## 2023-05-26 ASSESSMENT — VISUAL ACUITY: OU: 1

## 2023-05-26 ASSESSMENT — FIBROSIS 4 INDEX: FIB4 SCORE: 1.51

## 2023-05-27 NOTE — ASSESSMENT & PLAN NOTE
HPI from 5/5/2023 visit- Acute onset of right facial weakness (specifically eye lid, corner of lips) this morning. Persisting right head migraine the past 3 days prior to start of symptoms, most likely trigger. Usually takes ibuprofen for her migraines, did not get complete relief this time around. No changes in vision, speech, gait or mental status. Denies dizziness, vertigo, CP.   - start tapered treatment with oral prednisone (60mg daily x5 days; 40mg x3 days, 20mg x3 days, 10mg x2 days); adding 7 day valcyclovir 1gm TID considering above presenting symptoms.  - OTC eye lubricating drops for incomplete closure of right eye lid  - start sumatriptan today for migraine headache. Can repeat dose tomorrow if needed  - CT imaging, neuro referral if no improvement at follow up next week  ----  5/12/2023 - migraine resolved with use of sumatriptan use over several nights. Has mild headaches in the morning and is taking advil for this  - MRI brain ordered today   - facial paralysis did not improve. Referral placed for neurology  ---  5/26/2023 - completed steroid + valacyclovir; very slight improvement with right eye closure today; she understands sometimes can take months for bell's palsy to resolve.   - prn advil for headache; prn sumatriptan for more intense migraine; we discussed starting preventative therapy with propanolol or amitriptiline. She feels headaches have been less frequent and will continue with prn advil for now.   - continue with eye lubrication and taping close at night  - She has follow up appointment with neurology next week on 6/2/2023     MRI 5/16/2023  IMPRESSION:   Abnormal enhancement in the right facial nerve involving the distal canalicular and geniculate segment consistent with Bell's palsy.     A few scattered nonspecific T2 hyperintensities are present in the deep white matter, within expected limits for the age of the patient, most likely related to leukoariosis.

## 2023-05-27 NOTE — PROGRESS NOTES
Subjective       CC:   Chief Complaint   Patient presents with    Follow-Up        HPI:   Patient is a 47 y.o. established female patient with medical history listed below here today for evaluation and management of bells palsy. Acute onset of right side facial weakness on 5/5/2023; she completed prednisone 60mg tapered over 12 days with 7 days of valcyclovir. Referral placed at last visit & she has appt to see Neurology on 6/2/2023; we also got MRI which showed abnormal enhancement in the right facial nerve involving the distal canalicular and geniculate segment consistent with Bell's palsy. She has slight improvement with right eye closure today. She has mild head each in the evenings, says less frequent the past week. She takes advil for this. Safe dosing reviewed today. She also has prn sumatriptan for more intense migraine.     Problem   Bell's Palsy       Patient Active Problem List   Diagnosis    Throat swelling    Well woman exam with routine gynecological exam    Pain involving joints of fingers of both hands    Grade I hemorrhoids    Iron deficiency anemia    Positive OSWALDO (antinuclear antibody)    Upper respiratory symptom    IUD (intrauterine device) in place    Vitamin D deficiency    Obesity (BMI 30-39.9)    Bell's palsy    Intractable migraine without aura and with status migrainosus       No past medical history on file.     Past Surgical History:   Procedure Laterality Date    PRIMARY C SECTION          Current Outpatient Medications on File Prior to Visit   Medication Sig Dispense Refill    sumatriptan (IMITREX) 100 MG tablet Take 1 Tablet by mouth one time as needed for Migraine for up to 1 dose. 10 Tablet 1    ferrous sulfate 325 (65 Fe) MG tablet TAKE 1 TABLET BY MOUTH EVERY DAY 90 Tablet 0    albuterol 108 (90 Base) MCG/ACT Aero Soln inhalation aerosol Inhale 2 Puffs every four hours as needed for Shortness of Breath. 8 g 1     No current facility-administered medications on file prior to visit.         ROS:  Review of Systems   Constitutional: Negative.  Negative for fever and malaise/fatigue.   HENT: Negative.     Eyes: Negative.    Respiratory:  Negative for cough, sputum production and shortness of breath.    Cardiovascular:  Negative for chest pain, palpitations and leg swelling.   Musculoskeletal: Negative.    Skin: Negative.    Neurological:  Positive for focal weakness and headaches. Negative for dizziness, sensory change, speech change, seizures, loss of consciousness and weakness.       Objective       Exam:  /80   Pulse 80   Temp 36.1 °C (97 °F) (Temporal)   Wt 75.8 kg (167 lb)   LMP 05/03/2023 (Exact Date)   SpO2 98%   BMI 29.58 kg/m²  Body mass index is 29.58 kg/m².    Physical Exam  HENT:      Head: Normocephalic and atraumatic.      Jaw: There is normal jaw occlusion.      Right Ear: Hearing, tympanic membrane, ear canal and external ear normal.      Left Ear: Hearing, tympanic membrane, ear canal and external ear normal.      Nose: Nose normal.      Right Turbinates: Not enlarged.      Left Turbinates: Not enlarged.      Right Sinus: No maxillary sinus tenderness or frontal sinus tenderness.      Left Sinus: No maxillary sinus tenderness or frontal sinus tenderness.   Eyes:      General: Vision grossly intact. Gaze aligned appropriately.      Extraocular Movements: Extraocular movements intact.      Conjunctiva/sclera: Conjunctivae normal.      Comments: Right eyelid - not achieving complete closure when she blinks or closes eyes   Cardiovascular:      Rate and Rhythm: Normal rate and regular rhythm.   Pulmonary:      Effort: Pulmonary effort is normal.      Breath sounds: Normal breath sounds.   Musculoskeletal:         General: Normal range of motion.      Cervical back: Normal range of motion and neck supple.      Right lower leg: No edema.      Left lower leg: No edema.   Skin:     General: Skin is warm and dry.   Neurological:      Mental Status: She is alert and oriented to  person, place, and time.      GCS: GCS eye subscore is 4. GCS verbal subscore is 5. GCS motor subscore is 6.      Cranial Nerves: Facial asymmetry (droop right corner lips; incomplete closure of right eye lid) present. No dysarthria.      Sensory: Sensation is intact.      Motor: Motor function is intact.      Coordination: Coordination is intact.      Gait: Gait is intact.         Assessment & Plan       47 y.o. female with the following -     Problem List Items Addressed This Visit       Bell's palsy     HPI from 5/5/2023 visit- Acute onset of right facial weakness (specifically eye lid, corner of lips) this morning. Persisting right head migraine the past 3 days prior to start of symptoms, most likely trigger. Usually takes ibuprofen for her migraines, did not get complete relief this time around. No changes in vision, speech, gait or mental status. Denies dizziness, vertigo, CP.   - start tapered treatment with oral prednisone (60mg daily x5 days; 40mg x3 days, 20mg x3 days, 10mg x2 days); adding 7 day valcyclovir 1gm TID considering above presenting symptoms.  - OTC eye lubricating drops for incomplete closure of right eye lid  - start sumatriptan today for migraine headache. Can repeat dose tomorrow if needed  - CT imaging, neuro referral if no improvement at follow up next week  ----  5/12/2023 - migraine resolved with use of sumatriptan use over several nights. Has mild headaches in the morning and is taking advil for this  - MRI brain ordered today   - facial paralysis did not improve. Referral placed for neurology  ---  5/26/2023 - completed steroid + valacyclovir; very slight improvement with right eye closure today; she understands sometimes can take months for bell's palsy to resolve.   - prn advil for headache; prn sumatriptan for more intense migraine; we discussed starting preventative therapy with propanolol or amitriptiline. She feels headaches have been less frequent and will continue with prn  advil for now.   - continue with eye lubrication and taping close at night  - She has follow up appointment with neurology next week on 6/2/2023     MRI 5/16/2023  IMPRESSION:   Abnormal enhancement in the right facial nerve involving the distal canalicular and geniculate segment consistent with Bell's palsy.     A few scattered nonspecific T2 hyperintensities are present in the deep white matter, within expected limits for the age of the patient, most likely related to leukoariosis.            Educated in proper administration of medication(s) ordered today including safety, possible SE, risks, benefits, rationale and alternatives to therapy.     Return if symptoms worsen or fail to improve.    Please note that this dictation was created using voice recognition software. I have made every reasonable attempt to correct obvious errors, but I expect that there are errors of grammar and possibly content that I did not discover before finalizing the note.

## 2023-06-01 DIAGNOSIS — G43.011 INTRACTABLE MIGRAINE WITHOUT AURA AND WITH STATUS MIGRAINOSUS: ICD-10-CM

## 2023-06-01 RX ORDER — SUMATRIPTAN 100 MG/1
100 TABLET, FILM COATED ORAL
Qty: 9 TABLET | Refills: 0 | Status: SHIPPED | OUTPATIENT
Start: 2023-06-01 | End: 2024-01-30

## 2023-06-02 ENCOUNTER — OFFICE VISIT (OUTPATIENT)
Dept: NEUROLOGY | Facility: MEDICAL CENTER | Age: 48
End: 2023-06-02
Attending: STUDENT IN AN ORGANIZED HEALTH CARE EDUCATION/TRAINING PROGRAM
Payer: COMMERCIAL

## 2023-06-02 VITALS
OXYGEN SATURATION: 97 % | HEIGHT: 63 IN | SYSTOLIC BLOOD PRESSURE: 118 MMHG | TEMPERATURE: 97.7 F | WEIGHT: 167.55 LBS | DIASTOLIC BLOOD PRESSURE: 68 MMHG | HEART RATE: 68 BPM | BODY MASS INDEX: 29.69 KG/M2 | RESPIRATION RATE: 14 BRPM

## 2023-06-02 DIAGNOSIS — R27.8 SENSORY ATAXIA: ICD-10-CM

## 2023-06-02 DIAGNOSIS — Z84.0 FAMILY HISTORY OF LUPUS ERYTHEMATOSUS: ICD-10-CM

## 2023-06-02 DIAGNOSIS — M54.2 NECK PAIN ON RIGHT SIDE: ICD-10-CM

## 2023-06-02 DIAGNOSIS — G51.0 FACIAL PALSY: ICD-10-CM

## 2023-06-02 DIAGNOSIS — R29.818 ABNORMAL ROMBERG TEST: ICD-10-CM

## 2023-06-02 DIAGNOSIS — R47.1 DYSARTHRIA: ICD-10-CM

## 2023-06-02 DIAGNOSIS — K14.8 LESION OF TONGUE: ICD-10-CM

## 2023-06-02 DIAGNOSIS — Z87.898 HISTORY OF ELEVATED ANTINUCLEAR ANTIBODY (ANA): ICD-10-CM

## 2023-06-02 DIAGNOSIS — H93.231 HYPERACUSIS OF RIGHT EAR: ICD-10-CM

## 2023-06-02 DIAGNOSIS — R90.89 ABNORMAL BRAIN MRI: ICD-10-CM

## 2023-06-02 DIAGNOSIS — R20.8 DYSESTHESIA OF MULTIPLE SITES: ICD-10-CM

## 2023-06-02 PROCEDURE — 99211 OFF/OP EST MAY X REQ PHY/QHP: CPT | Performed by: STUDENT IN AN ORGANIZED HEALTH CARE EDUCATION/TRAINING PROGRAM

## 2023-06-02 PROCEDURE — 3078F DIAST BP <80 MM HG: CPT | Performed by: STUDENT IN AN ORGANIZED HEALTH CARE EDUCATION/TRAINING PROGRAM

## 2023-06-02 PROCEDURE — 99205 OFFICE O/P NEW HI 60 MIN: CPT | Performed by: STUDENT IN AN ORGANIZED HEALTH CARE EDUCATION/TRAINING PROGRAM

## 2023-06-02 PROCEDURE — 3074F SYST BP LT 130 MM HG: CPT | Performed by: STUDENT IN AN ORGANIZED HEALTH CARE EDUCATION/TRAINING PROGRAM

## 2023-06-02 ASSESSMENT — FIBROSIS 4 INDEX: FIB4 SCORE: 1.51

## 2023-06-02 NOTE — TELEPHONE ENCOUNTER
Requested Prescriptions     Signed Prescriptions Disp Refills    sumatriptan (IMITREX) 100 MG tablet 9 Tablet 0     Sig: TAKE 1 TABLET BY MOUTH ONE TIME AS NEEDED FOR MIGRAINE FOR UP TO 1 DOSE.     Authorizing Provider: JODEE KELLEY A.P.R.N.

## 2023-06-02 NOTE — PROGRESS NOTES
"NEUROLOGY NEW PATIENT ENCOUNTER - 06/02/2023   REFERRING PROVIDER:  Sofia Crane D.N.P.  [address] [place] [address]  Sofia Crane   REASON FOR VISIT: Alis Perdomo 47 y.o. female presents today to establish care with me.    SUMMARY OF PROBLEMS AND/OR DIAGNOSES:  Patient referred to me for evaluation of facial palsy. Per referring procider on 5/12/23:    \"Patient is a 47 y.o. established female patient with medical history listed below here today for follow up on bell's palsy. She had acute onset of right face paralysis last week 5/7/2023, precipitated by persisting right side migraine headache. I saw her same day in clinic and started her on tapered prednisone with valacyclovir. She returns today for 1 week follow up, no improvement in symptoms. Incomplete closure of right eye lid is more pronounced today. Headache subsided with sumatriptan. She is completing steroid and valacyclovir today. We will send her for MRI imaging and place referral for neurology today. \"    Patient returns today with gradual improvement in her symptoms.  She still has hyperacusis.  She is able to close her eyes all the way.  Still some mild weakness with smiling and closing eyes tightly.  Does have abnormal sensation.  She has diffuse but bilateral lesions erythematous in in her tongue which occurred in the setting of this.  She also reports lancinating lightening night pain going up and down the midline of her back and painful sensation/numbness around her torso which has since resolved but occurred at the onset of this.  Reports mild fever and a general feeling of malaise at the onset of the palsy.  Denies any hearing loss or tinnitus.  No balance issues.  No cognitive issues.  She has never had any other sensory motor deficits.  She has a family history, mother, with lupus.  She has an abnormal OSWALDO testing in the past 2019.  Denies any rashes outside of the tongue.  No joint pain or swelling.  No vision " "changes.  No history of vision loss or blurring of vision or ocular pain.  No vertigo.  She still mildly dysarthric and Abel looks slightly swollen.    Patient's PMH, PSH, FH, SH, allergies, and medications were reviewed:   has no past medical history of Asthma, Hyperlipidemia, or Hypertension.    has a past surgical history that includes primary c section.   family history includes Arthritis in her mother; Diabetes in her mother; Heart Disease in her mother; Hypertension in her mother; No Known Problems in her father; Thyroid in her mother and sister.    reports that she has never smoked. She has never used smokeless tobacco. She reports that she does not drink alcohol and does not use drugs.     ROS other negative     CURRENT MEDICATIONS AT THE TIME OF THIS ENCOUNTER:    Current Outpatient Medications:     sumatriptan, 100 mg, Oral, Once PRN, PRN    ferrous sulfate, 325 mg, Oral, DAILY, Taking    albuterol, 2 Puff, Inhalation, Q4HRS PRN, PRN     EXAM:   Ambulatory Vitals:  /68 (BP Location: Right arm, Patient Position: Sitting, BP Cuff Size: Adult)   Pulse 68   Temp 36.5 °C (97.7 °F) (Temporal)   Resp 14   Ht 1.6 m (5' 3\")   Wt 76 kg (167 lb 8.8 oz)   SpO2 97%    Physical Exam:  Physical Exam   Neurological Exam   Neurological exam is normal except for the following: Positive Romberg sign, numbness and hyperesthesia over the right posterior cervical neck, right posterior ear.  No meningismus.  Strength is symmetrical and intact throughout.  Tone is normal.  Reflexes are trace to 1+.  No signs of hyperreflexia.  Extraocular movements are intact.  Pupillary reactivity to light is normal.  She has some mild dysarthria lingual, which upon examination of her mouth her tongue is slightly swollen and enlarged.  There are patchy bilateral areas of erythema look relatively fresh.  Denies a history of herpes or vesicular like eruptions in any other part of her body.  Hearing is intact bilaterally.  Sensation on " the face is normal.  Some mild fasciculations noted over the right face.  Neck strength is intact shoulder strength is intact    RELEVANT DATA PERSONALLY REVIEWED:       ASSESSMENT, EDUCATION, COUNSELING:  This is a 47 y.o. female patient who presents to the neurology clinic. We had an extensive discussion about the patient's symptoms, signs, and work-up to date, if any. We discussed potential and/or definitive diagnoses, work-up, and potential treatments.       PLAN:  If applicable, the work-up such as labs, imaging, procedures, and/or other testing, referrals, and/or recommended treatment strategies are listed below.  Visit Diagnoses     ICD-10-CM   1. Facial palsy  G51.0   2. Hyperacusis of right ear  H93.231   3. Dysesthesia of multiple sites  R20.8   4. Lesion of tongue  K14.8   5. Dysarthria  R47.1   6. History of elevated antinuclear antibody (OSWALDO)  Z87.898   7. Family history of lupus erythematosus  Z84.0   8. Abnormal Romberg test  R29.818   9. Abnormal brain MRI  R90.89   10. Sensory ataxia  R27.8   11. Neck pain on right side  M54.2      Orders Placed This Encounter    MR-CERVICAL SPINE-WITH & W/O    MR-THORACIC SPINE-WITH & W/O    CBC WITH DIFFERENTIAL    Comp Metabolic Panel    CRP QUANTITIVE (NON-CARDIAC)    Sed Rate    ANGIOTENSIN I CONVERTING ENZYME    LYME WESTERN BLOT IGG + IGM    OSWALDO COMPREHENSIVE PANEL    RHEUMATOID ARTHRITIS FACTOR    Anti-Neutrophil Cytoplasmic Antibodies Screen    EBV ACUTE INFECTION AB PANEL    CMV ABS IGG & IGM, SERUM    VITAMIN B12    FOLATE    HOMOCYSTEINE    METHYLMALONIC ACID, SERUM    MOG IGG W/RFLX TITER,SERUM    AQUAPORIN-4 RECEPTOR AB    HEMOGLOBIN A1C    HSV (HERPES SIMPLEX VIRUS) BY PCR (BLOOD)    HSV I/II IGG & IGM SERUM    VZV PCR        Patient with a history of facial paralysis with some slow recovery although she has some symptoms that make me concerned for the potential of disease mimicker.  She has pain in the back of her head that is new and dysesthesias  over her right posterior ear and neck as well.  She has sensory ataxia that is mild but reproducibly present on Romberg testing.  She has bilateral eruptive lesions in her tongue along with this which speaks to again some mimickers such as Bechets disease or autoimmune rheumatological condition such as lupus or Sjogren's.  Therefore obtaining a more extensive work-up as detailed above starting with serum labs to evaluate for infectious and autoimmune and inflammatory conditions that can mimic this.  With her history that resembles Lhermitte sign and involvement of either nerve roots (radiculitis) she warrants further imaging of cervical and thoracic spine.  She has risk factors for having autoimmune disease as her mother has active multiple active autoimmune conditions such as lupus.  She herself has very moderately to high positive OSWALDO titers in the past.  I will follow her in 3 months sooner if needed.  Depending on her clinical trajectory results of her MRI and blood test will determine if further testing and/or management is done.  However, as of right now no further treatment is needed.        BILLING DOCUMENTATION:     I spent a total of  I spent a total of 60 minutes on the day of the visit.   minutes of face-to-face time in this visit. Over 50% of the time of the visit today was spent on counseling and/or coordination of care wtih the patient and/or family, as above in assessment in plan.    Uzair Myas MD  Epilepsy and General Neurology  Department of Neurology  Clinical  of Neurology [facility] [employer].

## 2023-06-02 NOTE — PATIENT INSTRUCTIONS
NEUROLOGY CLINIC VISIT WITH DR. MAYS       PATIENT EXPECTATIONS AND IMPORTANT APPOINTMENT REMINDERS:   You matter to Dr. Mays and you deserve the best care.   Dr. Mays prides himself on providing the best possible care to all his patients. He strives to make each appointment meaningful, so that all your concerns are being addressed and all your neurological problems are being optimally treated. In order to achieve these goals for everyone, Dr. Mays has listed important reminders and the best ways to prepare for each appointment. Please read each item carefully. Thank you!    REFILLS:   Request refills AT LEAST 1 week in advance to ensure you do not run out of medications    [employer]  It is STRONGLY encouraged that ALL patients sign up for [employer]. It is BY FAR the fastest and most convenient way for both Dr. Mays and patients to obtain timely refills.  If you are having trouble signing up or logging into your account, staff are available to help you. Please ask a medical assistant or staff at the  to assist you.    TEST RESULS:   All labs and diagnostic test results will be reviewed at your next visit, UNLESS  Dr. Mays determines that there are important findings on the tests need to be acted on sooner. Dr. Mays will either call or send a message through [employer] if this is the case.    BE PREPARED PRIOR TO EVERY APPOINTMENT:  All patient are responsible for ensuring that ALL test results that were completed outside of the [employer] system have been received by our Neurology Department PRIOR to your appointment with Dr. Mays.    IMPORTANT:  ALL images (not just the reports) must be sent and uploaded to the [facility] system. Dr. Mays reviews all images personally prior to each visit. Ensuring that ALL the test results and test images are accessible to Dr. Mays prior to your appointment is YOUR responsibility and an important part of making the most out of each appointment.   Bring a  [facility] picture ID and an updated insurance card EVERY visit.  It is highly recommended that you bring at every visit a list of the most important topics that you want address. While it may not be possible to address all items on the list in a single visit, preparing a list will ensure that Dr. Mays addresses the items that are most important to you and your health    PAPERWORK, DOCUMENTATION, LETTER REQUESTS:  You must notify the office ahead of your appointment of all paperwork or letter requests.   Please DO NOT wait until the last minute to make these requests. Please give all paperwork to the medical assistant at the start of the appointment and check-in process. Please note that Dr. Mays may not be able complete some types of documentation in a single appointment or even within a single day or week. This is why it is important to communicate paperwork requests prior to your appointment and at least 2 weeks prior to any deadlines.    KNOW ALL YOU MEDICATIONS:   AT EVERY SINGLE APPOINTMENT, please bring a list of every single prescribed, non-prescribed, and over the counter medication or supplements you are taking, including ones taken on a rare or intermittent basis.  Include the following information for each prescribed or non-prescribed medications:  Name of medication   The strength of EACH pill/capsule/tablet, etc.   The number of pills/capsules/tablets, etc taken per dose  The number and time of day that doses are taken  For every single Supplement that you take on a routine or intermittent basis, you must include:  The Brand Name   A complete list of every single ingredient, compound, vitamin, and/or mineral in each dose, along with the corresponding amounts/strengths of all ingredients, vitamins, minerals, etc., if such information is provided or known  The number of doses taken per day and time of day doses are taken  If medications are taken on an intermittent or as needed basis, please  "estimate how many days per week or days per month the medications are used  DO NOT just print out your medication list from [employer] or bring a list from a prior appointment or hospitalizations because the information is often often unreliable, inaccurate, outdated, and/or incomplete   The list should be printed or written  If you forget or do not have a list of all the medication, then it is acceptable, although less preferred, to bring all the bottles to the appointment     ARRIVE EARLY FOR ALL VISITS:  Please note that we are unable to accommodate late arrivals as per office policy.  YOU-the patient - (NOT a parent, spouse, or friend) must be physically present at check-in no later than 12 minutes after the scheduled appointment time, or you will be asked to reschedule   Consider scheduling a virtual appointment with Dr. Mays through [employer] as an alternative if transportation to the clinic is difficult or unavailable   Please note, however, that virtual visits can only be scheduled after being an established patient of Dr. Mays. All new appointments must be done in-person in clinic  Some insurances will not cover the cost of virtual appointments. Please check with your insurance to find out if these visits are covered    COMMUNICATING URGENT AND NON-URGENT MATTERS:  Your concerns are important and deserve to be heard and addressed. If you have an urgent matter, there are two methods that will ensure your concerns are prioritized appropriately:   Preferred method: Sign-up/Login to your [employer] account and send a message addressed to Dr. Mays or Lia Patel (Dr. Mays's assistant). In the subject line, type \"urgent\" followed by a word or phrase describing the situation (For example, write \"Urgent: Out of antiseuzre med and need refill\" or \"Urgent: Severe side effects to new meds\". In doing this, our staff can ensure urgent messages are triaged appropriately and communicated to Dr. Mays that day.  Call " [facility] main line at [phone]. Dr. Mays's voicemail extension is [phone]. When leaving a voice message, specifically indicate if it is urgent (or non-urgent) so that the matter can be triaged appropriately and addressed in a timely manner    Thank you for taking important action in your care!

## 2023-06-25 ENCOUNTER — HOSPITAL ENCOUNTER (OUTPATIENT)
Dept: RADIOLOGY | Facility: MEDICAL CENTER | Age: 48
End: 2023-06-25
Attending: STUDENT IN AN ORGANIZED HEALTH CARE EDUCATION/TRAINING PROGRAM
Payer: COMMERCIAL

## 2023-06-25 DIAGNOSIS — R20.8 DYSESTHESIA OF MULTIPLE SITES: ICD-10-CM

## 2023-06-25 DIAGNOSIS — R27.8 SENSORY ATAXIA: ICD-10-CM

## 2023-06-25 DIAGNOSIS — K14.8 LESION OF TONGUE: ICD-10-CM

## 2023-06-25 DIAGNOSIS — R90.89 ABNORMAL BRAIN MRI: ICD-10-CM

## 2023-06-25 DIAGNOSIS — Z87.898 HISTORY OF ELEVATED ANTINUCLEAR ANTIBODY (ANA): ICD-10-CM

## 2023-06-25 DIAGNOSIS — G51.0 FACIAL PALSY: ICD-10-CM

## 2023-06-25 PROCEDURE — 72156 MRI NECK SPINE W/O & W/DYE: CPT

## 2023-06-25 PROCEDURE — 72157 MRI CHEST SPINE W/O & W/DYE: CPT

## 2023-06-25 PROCEDURE — 700117 HCHG RX CONTRAST REV CODE 255: Performed by: STUDENT IN AN ORGANIZED HEALTH CARE EDUCATION/TRAINING PROGRAM

## 2023-06-25 PROCEDURE — A9579 GAD-BASE MR CONTRAST NOS,1ML: HCPCS | Performed by: STUDENT IN AN ORGANIZED HEALTH CARE EDUCATION/TRAINING PROGRAM

## 2023-06-25 RX ADMIN — GADOTERIDOL 16 ML: 279.3 INJECTION, SOLUTION INTRAVENOUS at 11:04

## 2024-01-11 ENCOUNTER — APPOINTMENT (OUTPATIENT)
Dept: NEUROLOGY | Facility: MEDICAL CENTER | Age: 49
End: 2024-01-11
Attending: STUDENT IN AN ORGANIZED HEALTH CARE EDUCATION/TRAINING PROGRAM
Payer: COMMERCIAL

## 2024-01-29 NOTE — PROGRESS NOTES
[facility]  GENERAL NEUROLOGY  NEW PATIENT VISIT    Referral source: Sofia Crane DNP    CC: Intactable migraine without aura and with status migrainosus, bell's palsy    HISTORY OF ILLNESS:  Alis Becker is a 48 y.o. woman with a history most notable for migraine and bell's palsy. She had bell's palsy in May of 2023. Since the bell's she started having a headache/pressure in her right parietal/ occipital region.  Today, Jaswant provided the following history:    Headache:  No reported aura. Headache starts in the right parietal/ occipital region. Does not radiate. Headache quality is described as a pressure/ heaviness. Intensity of headache 4/10. Intensity of headache with medication ibuprofen 800 mg will resolve headache.  Last for about 2 hours to a day. Frequency once a week. Associated symptoms: eye twitching, sound sensitivity, intermittent numbness to the right side of the face.Triggers: stress. Can possibly start headache with sudden movement and bending. Denies being able to trigger with coughing, laughing, crying, or sneezing. Headaches will occur more often when she is laying down for bed.    The following is a summary of headache symptoms, presented in my standard format:    Family History: none  Age at onset (years): started when she was pregnant with her third child  Location: see above   Radiation: see above  Frequency: see above   Duration: see above   Headache Days/Month:  December 4-5/30, January 5/30  Quality: see above   Intensity: see above   Aura: see above  Photophobia/Phonophobia/Nausea/Vomiting: no, yes, no, no  Provoked by Physical Activity?: yes  Triggers: see above   Associated Symptoms: see above  Autonomic Signs (such as ptosis, miosis, conjunctival injection, rhinorrhea, increased lacrimation): subjective right eye tearing  Head Trauma: no  Association with Menses: yes  ED Visits: no  Hospitalizations: no  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  Past Surgical History:   Procedure Laterality Date    PRIMARY C SECTION       MEDICATIONS:  Current Outpatient Medications   Medication Sig    sumatriptan (IMITREX) 100 MG tablet TAKE 1 TABLET BY MOUTH ONE TIME AS NEEDED FOR MIGRAINE FOR UP TO 1 DOSE.    ferrous sulfate 325 (65 Fe) MG tablet TAKE 1 TABLET BY MOUTH EVERY DAY    albuterol 108 (90 Base) MCG/ACT Aero Soln inhalation aerosol Inhale 2 Puffs every four hours as needed for Shortness of Breath.     SOCIAL HISTORY:  Social History     Tobacco Use    Smoking status: Never    Smokeless tobacco: Never   Substance Use Topics    Alcohol use: No     Social History     Social History Narrative    Not on file     FAMILY HISTORY:  Family History   Problem Relation Age of Onset    Diabetes Mother     Heart Disease Mother     Hypertension Mother     Arthritis Mother         lupus    Thyroid Mother     No Known Problems Father     Thyroid Sister        Ambulatory Vitals  Encounter Vitals  Temperature: 36.2 °C (97.2 °F)  Temp src: Temporal  Blood Pressure: 122/76  Pulse: 91  Pulse Oximetry: 97 %  Weight: 78 kg (171 lb 15.3 oz)  Height: 157.5 cm (5' 2\")  BMI (Calculated): 31.45     REVIEW OF SYSTEMS:  A ROS was completed.  Pertinent positives and negatives were included in the HPI, above.  All other systems were reviewed and are negative.    PHYSICAL EXAM:  General/Medical:  MARION presents clean and well-dressed.  She does not appear in any distress at this time.  She has good range of motion of her neck.  She has no malar rash.  She reports no jaw claudication or jaw pain.  She " reports no allodynia.  She does report right eye twitching which has been, increased and sometimes is present on the left side.  She reports no shooting pain across her face.  She has no upper or lower extremity edema.  She has palpable radial and pedal pulses.  She has prolonged capillary refill in all extremities.  Auscultation of her heart revealed S1 and S2 with no abnormal sounds.  Vital signs are listed above and are within normal limits.    Neuro:  MENTAL STATUS: awake and alert; no deficits of speech or language; oriented to person, place, and time; affect was appropriate to situation    CRANIAL NERVES:    II: acuity: J1+/J1+, fields: intact to confrontation, pupils: 3/3 to 2/2 without a relative afferent pupillary defect, discs: difficult to visualize    III/IV/VI: versions: intact without nystagmus    V: facial sensation: symmetric to light touch    VII: facial expression: symmetric    VIII: hearing: intact to finger rub    IX/X: palate: elevates symmetrically    XI: shoulder shrug: symmetric    XII: tongue: midline    MOTOR:  - bulk: normal throughout  - tone: normal throughout  Upper Extremity Strength  (R/L)    5/5   Elbow flexion 5/5   Elbow extension 5/5   Shoulder abduction 5/5     Lower Extremity Strength  (R/L)   Hip flexion 5/5   Knee extension 5/5   Knee flexion 5/5   Ankle plantarflexion 5/5   Ankle dorsiflexion 5/5     - pronator drift: absent  - abnormal movements: none    SENSATION:  - light touch: intact  - vibration:intact  - temperature:intact  - pinprick: NT  - proprioception: intact  - Romberg: absent    COORDINATION:  - finger to nose: normal, no ataxia on exam  - finger tapping: rapid and accurate, bilaterally  - foot tapping:rapid and accurate, bilaterally    REFLEXES:  Reflex Right Left   BR 2+ 2+   Biceps 2+ 2+   Triceps 2+ 2+   Patellae 2+ 2+   Achilles 2+ 2+   Toes Mute Mute     GAIT:  - narrow base and normal  - heel-walk: deferred patient wearing heels  - toe-walk: deferred  "patient wearing heels  - tandem gait: deferred patient wearing heels    REVIEW OF IMAGING STUDIES: I reviewed the following studies:  MRI Brain:  Date: 5/16/23  W/o and w/ contrast?:  with and without  Indication: \"acute onset right facial paralysis/bells palsy x 1 week\"  Comparison: none  Impression:  Abnormal enhancement in the right facial nerve involving the distal canalicular and geniculate segment consistent with Bell's palsy.  A few scattered nonspecific T2 hyperintensities are present in the deep white matter, within expected limits for the age of the patient, most likely related to leukoariosis.    REVIEW OF LABORATORY STUDIES:  No current pertinent labs    ASSESSMENT& PLAN:  1. Nonintractable headache, unspecified chronicity pattern, unspecified headache type  Jaswant presents with new onset headaches that occurred after she had Bell's palsy in May 2023.  She describes these headaches as a pressure/heaviness rating 4 out of 10 on the pain scale and occurring once a week.  The only associated symptom along with these headaches is some right eye twitching, intermittent numbness down her right face, some slight tearing on the right eye, and noise sensitivity but it is associated with her yelling.  She denies blurred vision, any auras, jaw claudication or jaw pain, or change in her current headaches.  Her neurological exam revealed no concerning findings.  She did have an MRI of her brain in May 2023 which was normal except for the right facial nerve which was consistent with Bell's palsy.  With a recent MRI of her brain and an normal neurological exam I do not feel we need to repeat imaging of her brain at this time.  Jaswant reports complete resolution of her headaches with 800 mg of ibuprofen although she does report she started with a headache on Saturday and has had a consistent 1 without it going away even though she has taken ibuprofen every day.We discussed switching from ibuprofen to indomethacin to try for " treatment of her headaches. Jaswant would like to stick to ibuprofen for now with the option to change to indomethacin in the future.  She is very concerned about having Bell's palsy again.  She states that she starting to have left-sided neck issues which was her first symptom prior to her Bell's palsy on the right.  We discussed a short burst of steroids for any possible inflamed nerves that she may have at this time.  We also discussed physical therapy for her neck to learn stretches and reduce neck tension that may be contributing to her headache.Jaswant would like to go ahead and have a short burst of steroids to help with any inflammation that she may have at this time and physical therapy for her neck.  She is reluctant to switch to indomethacin yet but she will reach out to me via [employer] if she wants to change to indomethacin.  She will follow-up with me in about 6 months to discuss current headaches and current medication regimen.  She can reach out via [employer] with any updates, concerns, or questions in the meantime.    - dexamethasone (DECADRON) 2 MG tablet; Take 4 Tablets by mouth every day for 1 day, THEN 3 Tablets every day for 1 day, THEN 2 Tablets every day for 1 day, THEN 1 Tablet every day for 1 day.  Dispense: 10 Tablet; Refill: 0  - Referral to Physical Therapy    2. History of Bell's palsy  Jaswant has a history of Bell's palsy that occurred in May 2023 on her right side of her face.  She presented to the ER and was initially treated with steroids and antivirals.  She had an MRI of her face which was consistent with Bell's palsy with the right facial nerve.  She is continuing to have some headache/head pressure on the right side with intermittent numbness on the right side of her face.  We discussed that she may have some lingering effects of her Bell's palsy although most symptoms resolve within 3 to 6 months after Bell's palsy.  She is afraid of Bell's palsy returning.  We discussed that it can return  it is more likely to occur on the same side as her initial attack but can switch to the contralateral side.  She will be given a short burst of steroids to break up her current headache hopefully this will help with any nerve inflammation that she has at this time.  She remembers what symptoms to look for and  to return if her Bell's palsy does return.    BILLING DOCUMENTATION:   I spent 60 minutes in patient care. This includes time with chart review, pre-charting, records review, discussions with other healthcare providers, and documentation. This also includes face-to-face time with the patient for: exam review, discussion and treatment planning.      Yamileth Miller MSN APRN-CNP  [facility] [place]

## 2024-01-30 ENCOUNTER — OFFICE VISIT (OUTPATIENT)
Dept: NEUROLOGY | Facility: MEDICAL CENTER | Age: 49
End: 2024-01-30
Payer: COMMERCIAL

## 2024-01-30 VITALS
HEART RATE: 91 BPM | DIASTOLIC BLOOD PRESSURE: 76 MMHG | HEIGHT: 62 IN | BODY MASS INDEX: 31.64 KG/M2 | SYSTOLIC BLOOD PRESSURE: 122 MMHG | OXYGEN SATURATION: 97 % | WEIGHT: 171.96 LBS | TEMPERATURE: 97.2 F

## 2024-01-30 DIAGNOSIS — R51.9 NONINTRACTABLE HEADACHE, UNSPECIFIED CHRONICITY PATTERN, UNSPECIFIED HEADACHE TYPE: ICD-10-CM

## 2024-01-30 DIAGNOSIS — Z86.69 HISTORY OF BELL'S PALSY: ICD-10-CM

## 2024-01-30 PROCEDURE — 3078F DIAST BP <80 MM HG: CPT

## 2024-01-30 PROCEDURE — 3074F SYST BP LT 130 MM HG: CPT

## 2024-01-30 PROCEDURE — 99205 OFFICE O/P NEW HI 60 MIN: CPT

## 2024-01-30 PROCEDURE — 99211 OFF/OP EST MAY X REQ PHY/QHP: CPT

## 2024-01-30 RX ORDER — DEXAMETHASONE 2 MG/1
TABLET ORAL
Qty: 10 TABLET | Refills: 0 | Status: SHIPPED | OUTPATIENT
Start: 2024-01-30 | End: 2024-02-03

## 2024-01-30 ASSESSMENT — FIBROSIS 4 INDEX: FIB4 SCORE: 1.54

## 2024-01-30 ASSESSMENT — PATIENT HEALTH QUESTIONNAIRE - PHQ9: CLINICAL INTERPRETATION OF PHQ2 SCORE: 0

## 2024-03-01 ENCOUNTER — OFFICE VISIT (OUTPATIENT)
Dept: MEDICAL GROUP | Facility: PHYSICIAN GROUP | Age: 49
End: 2024-03-01
Payer: COMMERCIAL

## 2024-03-01 VITALS
HEIGHT: 62 IN | BODY MASS INDEX: 31.28 KG/M2 | OXYGEN SATURATION: 98 % | SYSTOLIC BLOOD PRESSURE: 110 MMHG | TEMPERATURE: 98.7 F | WEIGHT: 170 LBS | DIASTOLIC BLOOD PRESSURE: 80 MMHG | HEART RATE: 76 BPM | RESPIRATION RATE: 16 BRPM

## 2024-03-01 DIAGNOSIS — T83.32XS INTRAUTERINE CONTRACEPTIVE DEVICE THREADS LOST, SEQUELA: ICD-10-CM

## 2024-03-01 DIAGNOSIS — N95.1 VASOMOTOR SYMPTOMS DUE TO MENOPAUSE: ICD-10-CM

## 2024-03-01 DIAGNOSIS — N91.2 AMENORRHEA: ICD-10-CM

## 2024-03-01 DIAGNOSIS — Z30.432 ENCOUNTER FOR IUD REMOVAL: ICD-10-CM

## 2024-03-01 LAB
POCT INT CON NEG: NEGATIVE
POCT INT CON POS: POSITIVE
POCT URINE PREGNANCY TEST: NEGATIVE

## 2024-03-01 PROCEDURE — 81025 URINE PREGNANCY TEST: CPT | Performed by: NURSE PRACTITIONER

## 2024-03-01 PROCEDURE — 99213 OFFICE O/P EST LOW 20 MIN: CPT | Performed by: NURSE PRACTITIONER

## 2024-03-01 PROCEDURE — 3079F DIAST BP 80-89 MM HG: CPT | Performed by: NURSE PRACTITIONER

## 2024-03-01 PROCEDURE — 3074F SYST BP LT 130 MM HG: CPT | Performed by: NURSE PRACTITIONER

## 2024-03-01 ASSESSMENT — ENCOUNTER SYMPTOMS
PSYCHIATRIC NEGATIVE: 1
COUGH: 0
FEVER: 0
SHORTNESS OF BREATH: 0
EYES NEGATIVE: 1
PALPITATIONS: 0
GASTROINTESTINAL NEGATIVE: 1
CONSTITUTIONAL NEGATIVE: 1
NEUROLOGICAL NEGATIVE: 1
SPUTUM PRODUCTION: 0
MUSCULOSKELETAL NEGATIVE: 1

## 2024-03-01 ASSESSMENT — FIBROSIS 4 INDEX: FIB4 SCORE: 1.54

## 2024-03-02 NOTE — PROGRESS NOTES
Subjective       CC:   Chief Complaint   Patient presents with    Hot Flashes        HPI:   Patient is a 48 y.o. established female patient with medical history listed below here today with new concerns of hot flashes. Her last menstrual cycle was on 12/16/2023. Her paragard IUD was inserted in 2012. Her last PAP with me was on 4/6/2023, NILM, -v HPV. At the time IUD strings were not visible. She got US on 5/1/2023 which showed IUD in place, normally positioned. I placed referral for her to see GYN but she was not able to schedule at the time.     HCG is negative today. We will resend GYN referral for IUD removal. Most likely her symptoms are perimenopausal. We discussed herbal supplements like ashwanghanda, black cohosh. SSRI like duloxetine. She had intense hot flashes last month, none this month. She would like to monitor symptoms for now.       Patient Active Problem List   Diagnosis    Throat swelling    Well woman exam with routine gynecological exam    Pain involving joints of fingers of both hands    Grade I hemorrhoids    Iron deficiency anemia    Positive OSWALDO (antinuclear antibody)    Upper respiratory symptom    IUD (intrauterine device) in place    Vitamin D deficiency    Obesity (BMI 30-39.9)    Bell's palsy    Intractable migraine without aura and with status migrainosus       No past medical history on file.     Past Surgical History:   Procedure Laterality Date    PRIMARY C SECTION          Current Outpatient Medications on File Prior to Visit   Medication Sig Dispense Refill    albuterol 108 (90 Base) MCG/ACT Aero Soln inhalation aerosol Inhale 2 Puffs every four hours as needed for Shortness of Breath. 8 g 1     No current facility-administered medications on file prior to visit.        ROS:  Review of Systems   Constitutional: Negative.  Negative for fever and malaise/fatigue.   HENT: Negative.     Eyes: Negative.    Respiratory:  Negative for cough, sputum production and shortness of breath.   "  Cardiovascular:  Negative for chest pain, palpitations and leg swelling.   Gastrointestinal: Negative.    Genitourinary: Negative.    Musculoskeletal: Negative.    Neurological: Negative.    Endo/Heme/Allergies: Negative.    Psychiatric/Behavioral: Negative.         Objective       Exam:  /80   Pulse 76   Temp 37.1 °C (98.7 °F) (Temporal)   Resp 16   Ht 1.575 m (5' 2\")   Wt 77.1 kg (170 lb)   SpO2 98%   BMI 31.09 kg/m²  Body mass index is 31.09 kg/m².    Physical Exam  Constitutional:       Appearance: Normal appearance.   Cardiovascular:      Rate and Rhythm: Normal rate and regular rhythm.   Pulmonary:      Effort: Pulmonary effort is normal.      Breath sounds: Normal breath sounds.   Skin:     General: Skin is warm and dry.   Neurological:      Mental Status: She is alert.        03/01/24 17:27   POC Urine Pregnancy Test Negative       Assessment & Plan       48 y.o. female with the following -   1. Amenorrhea  POCT Pregnancy      2. Intrauterine contraceptive device threads lost, sequela  Referral to Gynecology      3. Vasomotor symptoms due to menopause  Referral to Gynecology      4. Encounter for IUD removal  Referral to Gynecology            Educated in proper administration of medication(s) ordered today including safety, possible SE, risks, benefits, rationale and alternatives to therapy.     Return if symptoms worsen or fail to improve.    Please note that this dictation was created using voice recognition software. I have made every reasonable attempt to correct obvious errors, but I expect that there are errors of grammar and possibly content that I did not discover before finalizing the note.        "

## 2024-03-13 ENCOUNTER — APPOINTMENT (OUTPATIENT)
Dept: MEDICAL GROUP | Facility: PHYSICIAN GROUP | Age: 49
End: 2024-03-13
Payer: COMMERCIAL

## 2024-05-24 ENCOUNTER — DOCUMENTATION (OUTPATIENT)
Dept: HEALTH INFORMATION MANAGEMENT | Facility: OTHER | Age: 49
End: 2024-05-24
Payer: COMMERCIAL

## 2024-06-26 ENCOUNTER — TELEPHONE (OUTPATIENT)
Dept: HEALTH INFORMATION MANAGEMENT | Facility: OTHER | Age: 49
End: 2024-06-26
Payer: COMMERCIAL

## 2024-08-14 ENCOUNTER — APPOINTMENT (OUTPATIENT)
Dept: NEUROLOGY | Facility: MEDICAL CENTER | Age: 49
End: 2024-08-14
Payer: COMMERCIAL

## 2024-08-23 SDOH — ECONOMIC STABILITY: FOOD INSECURITY: WITHIN THE PAST 12 MONTHS, YOU WORRIED THAT YOUR FOOD WOULD RUN OUT BEFORE YOU GOT MONEY TO BUY MORE.: NEVER TRUE

## 2024-08-23 SDOH — HEALTH STABILITY: PHYSICAL HEALTH: ON AVERAGE, HOW MANY MINUTES DO YOU ENGAGE IN EXERCISE AT THIS LEVEL?: 20 MIN

## 2024-08-23 SDOH — ECONOMIC STABILITY: FOOD INSECURITY: WITHIN THE PAST 12 MONTHS, THE FOOD YOU BOUGHT JUST DIDN'T LAST AND YOU DIDN'T HAVE MONEY TO GET MORE.: NEVER TRUE

## 2024-08-23 SDOH — ECONOMIC STABILITY: INCOME INSECURITY: IN THE LAST 12 MONTHS, WAS THERE A TIME WHEN YOU WERE NOT ABLE TO PAY THE MORTGAGE OR RENT ON TIME?: NO

## 2024-08-23 SDOH — HEALTH STABILITY: PHYSICAL HEALTH: ON AVERAGE, HOW MANY DAYS PER WEEK DO YOU ENGAGE IN MODERATE TO STRENUOUS EXERCISE (LIKE A BRISK WALK)?: 1 DAY

## 2024-08-23 SDOH — HEALTH STABILITY: MENTAL HEALTH
STRESS IS WHEN SOMEONE FEELS TENSE, NERVOUS, ANXIOUS, OR CAN'T SLEEP AT NIGHT BECAUSE THEIR MIND IS TROUBLED. HOW STRESSED ARE YOU?: NOT AT ALL

## 2024-08-23 SDOH — ECONOMIC STABILITY: INCOME INSECURITY: HOW HARD IS IT FOR YOU TO PAY FOR THE VERY BASICS LIKE FOOD, HOUSING, MEDICAL CARE, AND HEATING?: NOT HARD AT ALL

## 2024-08-23 ASSESSMENT — SOCIAL DETERMINANTS OF HEALTH (SDOH)
DO YOU BELONG TO ANY CLUBS OR ORGANIZATIONS SUCH AS CHURCH GROUPS UNIONS, FRATERNAL OR ATHLETIC GROUPS, OR SCHOOL GROUPS?: NO
HOW MANY DRINKS CONTAINING ALCOHOL DO YOU HAVE ON A TYPICAL DAY WHEN YOU ARE DRINKING: 1 OR 2
HOW HARD IS IT FOR YOU TO PAY FOR THE VERY BASICS LIKE FOOD, HOUSING, MEDICAL CARE, AND HEATING?: NOT HARD AT ALL
HOW OFTEN DO YOU HAVE A DRINK CONTAINING ALCOHOL: NEVER
HOW OFTEN DO YOU ATTEND CHURCH OR RELIGIOUS SERVICES?: MORE THAN 4 TIMES PER YEAR
IN A TYPICAL WEEK, HOW MANY TIMES DO YOU TALK ON THE PHONE WITH FAMILY, FRIENDS, OR NEIGHBORS?: MORE THAN THREE TIMES A WEEK
HOW OFTEN DO YOU ATTENT MEETINGS OF THE CLUB OR ORGANIZATION YOU BELONG TO?: NEVER
HOW OFTEN DO YOU GET TOGETHER WITH FRIENDS OR RELATIVES?: MORE THAN THREE TIMES A WEEK
HOW OFTEN DO YOU ATTENT MEETINGS OF THE CLUB OR ORGANIZATION YOU BELONG TO?: NEVER
HOW OFTEN DO YOU ATTEND CHURCH OR RELIGIOUS SERVICES?: MORE THAN 4 TIMES PER YEAR
HOW OFTEN DO YOU GET TOGETHER WITH FRIENDS OR RELATIVES?: MORE THAN THREE TIMES A WEEK
IN THE PAST 12 MONTHS, HAS THE ELECTRIC, GAS, OIL, OR WATER COMPANY THREATENED TO SHUT OFF SERVICE IN YOUR HOME?: NO
IN A TYPICAL WEEK, HOW MANY TIMES DO YOU TALK ON THE PHONE WITH FAMILY, FRIENDS, OR NEIGHBORS?: MORE THAN THREE TIMES A WEEK
WITHIN THE PAST 12 MONTHS, YOU WORRIED THAT YOUR FOOD WOULD RUN OUT BEFORE YOU GOT THE MONEY TO BUY MORE: NEVER TRUE
DO YOU BELONG TO ANY CLUBS OR ORGANIZATIONS SUCH AS CHURCH GROUPS UNIONS, FRATERNAL OR ATHLETIC GROUPS, OR SCHOOL GROUPS?: NO
HOW OFTEN DO YOU HAVE SIX OR MORE DRINKS ON ONE OCCASION: NEVER

## 2024-08-23 ASSESSMENT — LIFESTYLE VARIABLES
HOW OFTEN DO YOU HAVE SIX OR MORE DRINKS ON ONE OCCASION: NEVER
HOW MANY STANDARD DRINKS CONTAINING ALCOHOL DO YOU HAVE ON A TYPICAL DAY: 1 OR 2
SKIP TO QUESTIONS 9-10: 1
AUDIT-C TOTAL SCORE: 0
HOW OFTEN DO YOU HAVE A DRINK CONTAINING ALCOHOL: NEVER

## 2024-08-26 ENCOUNTER — APPOINTMENT (OUTPATIENT)
Dept: MEDICAL GROUP | Facility: PHYSICIAN GROUP | Age: 49
End: 2024-08-26
Payer: COMMERCIAL

## 2024-10-23 ENCOUNTER — OFFICE VISIT (OUTPATIENT)
Dept: MEDICAL GROUP | Facility: PHYSICIAN GROUP | Age: 49
End: 2024-10-23
Payer: COMMERCIAL

## 2024-10-23 VITALS
DIASTOLIC BLOOD PRESSURE: 84 MMHG | OXYGEN SATURATION: 98 % | SYSTOLIC BLOOD PRESSURE: 126 MMHG | TEMPERATURE: 97.1 F | WEIGHT: 170 LBS | BODY MASS INDEX: 30.12 KG/M2 | HEART RATE: 72 BPM | HEIGHT: 63 IN

## 2024-10-23 DIAGNOSIS — Z86.69 HISTORY OF BELL'S PALSY: ICD-10-CM

## 2024-10-23 DIAGNOSIS — Z97.5 IUD (INTRAUTERINE DEVICE) IN PLACE: ICD-10-CM

## 2024-10-23 DIAGNOSIS — Z76.89 ENCOUNTER TO ESTABLISH CARE: ICD-10-CM

## 2024-10-23 DIAGNOSIS — Z13.0 SCREENING FOR ENDOCRINE, METABOLIC AND IMMUNITY DISORDER: ICD-10-CM

## 2024-10-23 DIAGNOSIS — Z23 NEED FOR VACCINATION: ICD-10-CM

## 2024-10-23 DIAGNOSIS — Z12.12 SCREENING FOR COLORECTAL CANCER: ICD-10-CM

## 2024-10-23 DIAGNOSIS — Z00.00 PREVENTATIVE HEALTH CARE: ICD-10-CM

## 2024-10-23 DIAGNOSIS — Z01.84 IMMUNITY STATUS TESTING: ICD-10-CM

## 2024-10-23 DIAGNOSIS — G43.011 INTRACTABLE MIGRAINE WITHOUT AURA AND WITH STATUS MIGRAINOSUS: ICD-10-CM

## 2024-10-23 DIAGNOSIS — Z13.228 SCREENING FOR ENDOCRINE, METABOLIC AND IMMUNITY DISORDER: ICD-10-CM

## 2024-10-23 DIAGNOSIS — Z11.59 NEED FOR HEPATITIS C SCREENING TEST: ICD-10-CM

## 2024-10-23 DIAGNOSIS — Z12.11 SCREENING FOR COLORECTAL CANCER: ICD-10-CM

## 2024-10-23 DIAGNOSIS — Z12.31 ENCOUNTER FOR SCREENING MAMMOGRAM FOR BREAST CANCER: ICD-10-CM

## 2024-10-23 DIAGNOSIS — Z13.29 SCREENING FOR ENDOCRINE, METABOLIC AND IMMUNITY DISORDER: ICD-10-CM

## 2024-10-23 DIAGNOSIS — D50.8 OTHER IRON DEFICIENCY ANEMIA: ICD-10-CM

## 2024-10-23 DIAGNOSIS — Z11.4 SCREENING FOR HIV (HUMAN IMMUNODEFICIENCY VIRUS): ICD-10-CM

## 2024-10-23 PROBLEM — R22.1 THROAT SWELLING: Status: RESOLVED | Noted: 2017-02-24 | Resolved: 2024-10-23

## 2024-10-23 PROBLEM — R09.89 UPPER RESPIRATORY SYMPTOM: Status: RESOLVED | Noted: 2020-11-30 | Resolved: 2024-10-23

## 2024-10-23 PROBLEM — Z01.419 WELL WOMAN EXAM WITH ROUTINE GYNECOLOGICAL EXAM: Status: RESOLVED | Noted: 2018-12-10 | Resolved: 2024-10-23

## 2024-10-23 PROCEDURE — 3074F SYST BP LT 130 MM HG: CPT | Performed by: NURSE PRACTITIONER

## 2024-10-23 PROCEDURE — 90715 TDAP VACCINE 7 YRS/> IM: CPT | Performed by: NURSE PRACTITIONER

## 2024-10-23 PROCEDURE — 99214 OFFICE O/P EST MOD 30 MIN: CPT | Mod: 25 | Performed by: NURSE PRACTITIONER

## 2024-10-23 PROCEDURE — 3079F DIAST BP 80-89 MM HG: CPT | Performed by: NURSE PRACTITIONER

## 2024-10-23 PROCEDURE — 90471 IMMUNIZATION ADMIN: CPT | Performed by: NURSE PRACTITIONER

## 2024-11-09 ENCOUNTER — HOSPITAL ENCOUNTER (OUTPATIENT)
Dept: LAB | Facility: MEDICAL CENTER | Age: 49
End: 2024-11-09
Attending: NURSE PRACTITIONER
Payer: COMMERCIAL

## 2024-11-09 DIAGNOSIS — Z13.0 SCREENING FOR ENDOCRINE, METABOLIC AND IMMUNITY DISORDER: ICD-10-CM

## 2024-11-09 DIAGNOSIS — Z11.59 NEED FOR HEPATITIS C SCREENING TEST: ICD-10-CM

## 2024-11-09 DIAGNOSIS — D50.8 OTHER IRON DEFICIENCY ANEMIA: ICD-10-CM

## 2024-11-09 DIAGNOSIS — Z13.228 SCREENING FOR ENDOCRINE, METABOLIC AND IMMUNITY DISORDER: ICD-10-CM

## 2024-11-09 DIAGNOSIS — Z11.4 SCREENING FOR HIV (HUMAN IMMUNODEFICIENCY VIRUS): ICD-10-CM

## 2024-11-09 DIAGNOSIS — Z00.00 PREVENTATIVE HEALTH CARE: ICD-10-CM

## 2024-11-09 DIAGNOSIS — Z01.84 IMMUNITY STATUS TESTING: ICD-10-CM

## 2024-11-09 DIAGNOSIS — Z13.29 SCREENING FOR ENDOCRINE, METABOLIC AND IMMUNITY DISORDER: ICD-10-CM

## 2024-11-09 LAB
25(OH)D3 SERPL-MCNC: 22 NG/ML (ref 30–100)
ALBUMIN SERPL BCP-MCNC: 4.1 G/DL (ref 3.2–4.9)
ALBUMIN/GLOB SERPL: 1 G/DL
ALP SERPL-CCNC: 73 U/L (ref 30–99)
ALT SERPL-CCNC: 60 U/L (ref 2–50)
ANION GAP SERPL CALC-SCNC: 12 MMOL/L (ref 7–16)
AST SERPL-CCNC: 59 U/L (ref 12–45)
BASOPHILS # BLD AUTO: 0.8 % (ref 0–1.8)
BASOPHILS # BLD: 0.04 K/UL (ref 0–0.12)
BILIRUB SERPL-MCNC: 0.4 MG/DL (ref 0.1–1.5)
BUN SERPL-MCNC: 12 MG/DL (ref 8–22)
CALCIUM ALBUM COR SERPL-MCNC: 9.9 MG/DL (ref 8.5–10.5)
CALCIUM SERPL-MCNC: 10 MG/DL (ref 8.5–10.5)
CHLORIDE SERPL-SCNC: 104 MMOL/L (ref 96–112)
CHOLEST SERPL-MCNC: 151 MG/DL (ref 100–199)
CO2 SERPL-SCNC: 25 MMOL/L (ref 20–33)
CREAT SERPL-MCNC: 0.67 MG/DL (ref 0.5–1.4)
EOSINOPHIL # BLD AUTO: 0.24 K/UL (ref 0–0.51)
EOSINOPHIL NFR BLD: 4.8 % (ref 0–6.9)
ERYTHROCYTE [DISTWIDTH] IN BLOOD BY AUTOMATED COUNT: 42.3 FL (ref 35.9–50)
FERRITIN SERPL-MCNC: 12.3 NG/ML (ref 10–291)
GFR SERPLBLD CREATININE-BSD FMLA CKD-EPI: 106 ML/MIN/1.73 M 2
GLOBULIN SER CALC-MCNC: 4 G/DL (ref 1.9–3.5)
GLUCOSE SERPL-MCNC: 87 MG/DL (ref 65–99)
HBV SURFACE AB SERPL IA-ACNC: <3.5 MIU/ML (ref 0–10)
HCT VFR BLD AUTO: 44.7 % (ref 37–47)
HCV AB SER QL: NORMAL
HDLC SERPL-MCNC: 41 MG/DL
HGB BLD-MCNC: 14.3 G/DL (ref 12–16)
HIV 1+2 AB+HIV1 P24 AG SERPL QL IA: NORMAL
IMM GRANULOCYTES # BLD AUTO: 0.01 K/UL (ref 0–0.11)
IMM GRANULOCYTES NFR BLD AUTO: 0.2 % (ref 0–0.9)
IRON SATN MFR SERPL: 13 % (ref 15–55)
IRON SERPL-MCNC: 63 UG/DL (ref 40–170)
LDLC SERPL CALC-MCNC: 95 MG/DL
LYMPHOCYTES # BLD AUTO: 2.35 K/UL (ref 1–4.8)
LYMPHOCYTES NFR BLD: 47.3 % (ref 22–41)
MCH RBC QN AUTO: 26.8 PG (ref 27–33)
MCHC RBC AUTO-ENTMCNC: 32 G/DL (ref 32.2–35.5)
MCV RBC AUTO: 83.9 FL (ref 81.4–97.8)
MONOCYTES # BLD AUTO: 0.44 K/UL (ref 0–0.85)
MONOCYTES NFR BLD AUTO: 8.9 % (ref 0–13.4)
NEUTROPHILS # BLD AUTO: 1.89 K/UL (ref 1.82–7.42)
NEUTROPHILS NFR BLD: 38 % (ref 44–72)
NRBC # BLD AUTO: 0 K/UL
NRBC BLD-RTO: 0 /100 WBC (ref 0–0.2)
PLATELET # BLD AUTO: 270 K/UL (ref 164–446)
PMV BLD AUTO: 11.7 FL (ref 9–12.9)
POTASSIUM SERPL-SCNC: 4.6 MMOL/L (ref 3.6–5.5)
PROT SERPL-MCNC: 8.1 G/DL (ref 6–8.2)
RBC # BLD AUTO: 5.33 M/UL (ref 4.2–5.4)
SODIUM SERPL-SCNC: 141 MMOL/L (ref 135–145)
TIBC SERPL-MCNC: 474 UG/DL (ref 250–450)
TRIGL SERPL-MCNC: 76 MG/DL (ref 0–149)
TSH SERPL DL<=0.005 MIU/L-ACNC: 1.14 UIU/ML (ref 0.38–5.33)
UIBC SERPL-MCNC: 411 UG/DL (ref 110–370)
WBC # BLD AUTO: 5 K/UL (ref 4.8–10.8)

## 2024-11-09 PROCEDURE — 86706 HEP B SURFACE ANTIBODY: CPT

## 2024-11-09 PROCEDURE — 83550 IRON BINDING TEST: CPT

## 2024-11-09 PROCEDURE — 80061 LIPID PANEL: CPT

## 2024-11-09 PROCEDURE — 36415 COLL VENOUS BLD VENIPUNCTURE: CPT

## 2024-11-09 PROCEDURE — 82728 ASSAY OF FERRITIN: CPT

## 2024-11-09 PROCEDURE — 86803 HEPATITIS C AB TEST: CPT

## 2024-11-09 PROCEDURE — 85025 COMPLETE CBC W/AUTO DIFF WBC: CPT

## 2024-11-09 PROCEDURE — 83540 ASSAY OF IRON: CPT

## 2024-11-09 PROCEDURE — 82306 VITAMIN D 25 HYDROXY: CPT

## 2024-11-09 PROCEDURE — 84443 ASSAY THYROID STIM HORMONE: CPT

## 2024-11-09 PROCEDURE — 80053 COMPREHEN METABOLIC PANEL: CPT

## 2024-11-09 PROCEDURE — 87389 HIV-1 AG W/HIV-1&-2 AB AG IA: CPT

## 2024-11-21 LAB — NONINV COLON CA DNA+OCC BLD SCRN STL QL: NEGATIVE

## 2024-12-19 ENCOUNTER — OFFICE VISIT (OUTPATIENT)
Dept: MEDICAL GROUP | Facility: PHYSICIAN GROUP | Age: 49
End: 2024-12-19
Payer: COMMERCIAL

## 2024-12-19 VITALS
SYSTOLIC BLOOD PRESSURE: 106 MMHG | TEMPERATURE: 96 F | HEIGHT: 62 IN | OXYGEN SATURATION: 96 % | DIASTOLIC BLOOD PRESSURE: 74 MMHG | BODY MASS INDEX: 32.66 KG/M2 | WEIGHT: 177.47 LBS | HEART RATE: 85 BPM

## 2024-12-19 DIAGNOSIS — R74.8 ELEVATED LIVER ENZYMES: ICD-10-CM

## 2024-12-19 DIAGNOSIS — E55.9 VITAMIN D DEFICIENCY: ICD-10-CM

## 2024-12-19 DIAGNOSIS — Z71.2 ENCOUNTER TO DISCUSS TEST RESULTS: ICD-10-CM

## 2024-12-19 DIAGNOSIS — Z23 NEED FOR VACCINATION: ICD-10-CM

## 2024-12-19 DIAGNOSIS — D50.8 OTHER IRON DEFICIENCY ANEMIA: ICD-10-CM

## 2024-12-19 PROCEDURE — 90471 IMMUNIZATION ADMIN: CPT

## 2024-12-19 PROCEDURE — 90746 HEPB VACCINE 3 DOSE ADULT IM: CPT | Mod: JZ

## 2024-12-19 PROCEDURE — 3074F SYST BP LT 130 MM HG: CPT | Performed by: NURSE PRACTITIONER

## 2024-12-19 PROCEDURE — 99214 OFFICE O/P EST MOD 30 MIN: CPT | Mod: 25 | Performed by: NURSE PRACTITIONER

## 2024-12-19 PROCEDURE — 3078F DIAST BP <80 MM HG: CPT | Performed by: NURSE PRACTITIONER

## 2024-12-19 RX ORDER — FERROUS SULFATE 325(65) MG
325 TABLET ORAL
Qty: 45 TABLET | Refills: 0 | Status: SHIPPED | OUTPATIENT
Start: 2024-12-20

## 2024-12-19 ASSESSMENT — FIBROSIS 4 INDEX: FIB4 SCORE: 1.41

## 2024-12-19 NOTE — ASSESSMENT & PLAN NOTE
Recent AST and ALT elevated.  She does not drink any alcohol.  No current medications.  She is not having any right upper quadrant pain, nausea, vomiting or jaundice.  Plan for repeat labs in 3 months.

## 2024-12-19 NOTE — ASSESSMENT & PLAN NOTE
No current iron supplementation.  Recent labs do show iron deficiency.  Plan for 3-month course of ferrous sulfate then repeat labs.  She tolerated iron before without any constipation or stool changes.

## 2024-12-19 NOTE — PROGRESS NOTES
"Subjective:     CC: Lab results    HPI:   Alis presents today with the following:    Vitamin D deficiency  Recent labs show vitamin D level of 22.  She has started taking vitamin D3 2000 units daily.    Iron deficiency anemia  No current iron supplementation.  Recent labs do show iron deficiency.  Plan for 3-month course of ferrous sulfate then repeat labs.  She tolerated iron before without any constipation or stool changes.    Elevated liver enzymes  Recent AST and ALT elevated.  She does not drink any alcohol.  No current medications.  She is not having any right upper quadrant pain, nausea, vomiting or jaundice.  Plan for repeat labs in 3 months.      History reviewed. No pertinent past medical history.    Social History     Tobacco Use    Smoking status: Never    Smokeless tobacco: Never   Vaping Use    Vaping status: Never Used   Substance Use Topics    Alcohol use: No    Drug use: No       Current Outpatient Medications Ordered in Epic   Medication Sig Dispense Refill    [START ON 12/20/2024] ferrous sulfate 325 (65 Fe) MG tablet Take 1 Tablet by mouth every Monday, Wednesday, and Friday. 45 Tablet 0    Cholecalciferol (VITAMIN D-3 PO) Take 2,000 Units by mouth every day.      albuterol 108 (90 Base) MCG/ACT Aero Soln inhalation aerosol Inhale 2 Puffs every four hours as needed for Shortness of Breath. 8 g 1     No current [facility]-ordered facility-administered medications on file.       Allergies:  Patient has no known allergies.    Health Maintenance: Recent labs shows no hep B immunity, starting hep B vaccine series.  Encouraged shingles vaccine at the pharmacy.      Objective:     Vital signs reviewed  Exam:  /74 (BP Location: Left arm, Patient Position: Sitting, BP Cuff Size: Adult)   Pulse 85   Temp (!) 35.6 °C (96 °F) (Temporal)   Ht 1.575 m (5' 2\")   Wt 80.5 kg (177 lb 7.5 oz)   SpO2 96%   BMI 32.46 kg/m²  Body mass index is 32.46 kg/m².    Gen: Alert and oriented, No apparent " distress.  Eyes:   Lids normal. Glasses in place.   Lungs: Normal effort, no audible wheezes  CV: Skin pink, warm and dry.  Ext: No clubbing, cyanosis, edema.      Assessment & Plan:     49 y.o. female with the following -     1. Other iron deficiency anemia  Chronic exacerbated problem.  Start ferrous sulfate 325 mg every Monday, Wednesday and Friday for the next 3 months after completion repeat labs.  - ferrous sulfate 325 (65 Fe) MG tablet; Take 1 Tablet by mouth every Monday, Wednesday, and Friday.  Dispense: 45 Tablet; Refill: 0  - CBC WITHOUT DIFFERENTIAL; Future  - IRON/TOTAL IRON BIND; Future  - FERRITIN; Future    2. Elevated liver enzymes  Acute uncomplicated problem.  New problem to examiner.  Recheck labs in 3 months.  - HEPATIC FUNCTION PANEL; Future    3. Vitamin D deficiency  Chronic exacerbated problem.  Continue over-the-counter vitamin D3 2000 units daily repeat labs in 3 months.  - VITAMIN D,25 HYDROXY (DEFICIENCY); Future    4. Need for vaccination  Acute uncomplicated problem.  She would like hepatitis B vaccine today. I have placed the below orders and discussed them with an approved delegating provider. The MA is performing the below orders under the direction of Dr. Vega.  - Hep B Adult 20+    5. Encounter to discuss test results  Acute uncomplicated problem.  Discussed and reviewed lab results from 11/9/2024.      Return in about 4 months (around 4/19/2025) for Labs.    Please note that this dictation was created using voice recognition software. I have made every reasonable attempt to correct obvious errors, but I expect that there are errors of grammar and possibly content that I did not discover before finalizing the note.

## 2025-01-21 ENCOUNTER — NON-PROVIDER VISIT (OUTPATIENT)
Dept: MEDICAL GROUP | Facility: PHYSICIAN GROUP | Age: 50
End: 2025-01-21
Payer: COMMERCIAL

## 2025-01-21 DIAGNOSIS — Z23 NEED FOR VACCINATION: ICD-10-CM

## 2025-01-21 PROCEDURE — 90746 HEPB VACCINE 3 DOSE ADULT IM: CPT | Performed by: NURSE PRACTITIONER

## 2025-01-21 PROCEDURE — 90471 IMMUNIZATION ADMIN: CPT | Performed by: NURSE PRACTITIONER

## 2025-01-22 NOTE — PROGRESS NOTES
"Jaswant Becker is a 49 y.o. female here for a non-provider visit for:   HEPATITIS B 2 of 3    Reason for immunization: continue or complete series started at the office  Immunization records indicate need for vaccine: Yes, confirmed with Epic  Minimum interval has been met for this vaccine: Yes  ABN completed: Yes    VIS Dated  1/21/2025 was given to patient: Yes  All IAC Questionnaire questions were answered \"No.\"    Patient tolerated injection and no adverse effects were observed or reported: Yes    Pt scheduled for next dose in series: Yes   "

## 2025-03-05 DIAGNOSIS — D50.8 OTHER IRON DEFICIENCY ANEMIA: ICD-10-CM

## 2025-03-05 RX ORDER — FERROUS SULFATE 325(65) MG
325 TABLET ORAL
Qty: 36 TABLET | Refills: 0 | Status: SHIPPED | OUTPATIENT
Start: 2025-03-05

## 2025-03-06 NOTE — TELEPHONE ENCOUNTER
Requested Prescriptions     Signed Prescriptions Disp Refills    ferrous sulfate 325 (65 Fe) MG tablet 36 Tablet 0     Sig: TAKE 1 TABLET BY MOUTH EVERY MONDAY, WEDNESDAY, AND FRIDAY     Authorizing Provider: JODEE KELLEY A.P.R.N.

## 2025-03-08 ENCOUNTER — HOSPITAL ENCOUNTER (OUTPATIENT)
Dept: LAB | Facility: MEDICAL CENTER | Age: 50
End: 2025-03-08
Attending: NURSE PRACTITIONER
Payer: COMMERCIAL

## 2025-03-08 DIAGNOSIS — E55.9 VITAMIN D DEFICIENCY: ICD-10-CM

## 2025-03-08 DIAGNOSIS — D50.8 OTHER IRON DEFICIENCY ANEMIA: ICD-10-CM

## 2025-03-08 DIAGNOSIS — R74.8 ELEVATED LIVER ENZYMES: ICD-10-CM

## 2025-03-08 LAB
ERYTHROCYTE [DISTWIDTH] IN BLOOD BY AUTOMATED COUNT: 49.6 FL (ref 35.9–50)
HCT VFR BLD AUTO: 44.9 % (ref 37–47)
HGB BLD-MCNC: 14.4 G/DL (ref 12–16)
MCH RBC QN AUTO: 27.9 PG (ref 27–33)
MCHC RBC AUTO-ENTMCNC: 32.1 G/DL (ref 32.2–35.5)
MCV RBC AUTO: 86.8 FL (ref 81.4–97.8)
PLATELET # BLD AUTO: 206 K/UL (ref 164–446)
PMV BLD AUTO: 11.3 FL (ref 9–12.9)
RBC # BLD AUTO: 5.17 M/UL (ref 4.2–5.4)
WBC # BLD AUTO: 6.1 K/UL (ref 4.8–10.8)

## 2025-03-08 PROCEDURE — 80076 HEPATIC FUNCTION PANEL: CPT

## 2025-03-08 PROCEDURE — 82306 VITAMIN D 25 HYDROXY: CPT

## 2025-03-08 PROCEDURE — 83540 ASSAY OF IRON: CPT

## 2025-03-08 PROCEDURE — 82728 ASSAY OF FERRITIN: CPT

## 2025-03-08 PROCEDURE — 36415 COLL VENOUS BLD VENIPUNCTURE: CPT

## 2025-03-08 PROCEDURE — 85027 COMPLETE CBC AUTOMATED: CPT

## 2025-03-08 PROCEDURE — 83550 IRON BINDING TEST: CPT

## 2025-03-11 ENCOUNTER — RESULTS FOLLOW-UP (OUTPATIENT)
Dept: MEDICAL GROUP | Facility: PHYSICIAN GROUP | Age: 50
End: 2025-03-11
Payer: COMMERCIAL

## 2025-03-15 ENCOUNTER — HOSPITAL ENCOUNTER (OUTPATIENT)
Facility: MEDICAL CENTER | Age: 50
End: 2025-03-15
Attending: NURSE PRACTITIONER
Payer: COMMERCIAL

## 2025-03-15 LAB
25(OH)D3 SERPL-MCNC: 40 NG/ML (ref 30–100)
FERRITIN SERPL-MCNC: 21.4 NG/ML (ref 10–291)
IRON SATN MFR SERPL: 14 % (ref 15–55)
IRON SERPL-MCNC: 56 UG/DL (ref 40–170)
TIBC SERPL-MCNC: 396 UG/DL (ref 250–450)
UIBC SERPL-MCNC: 340 UG/DL (ref 110–370)

## 2025-03-15 PROCEDURE — 82306 VITAMIN D 25 HYDROXY: CPT

## 2025-03-15 PROCEDURE — 82728 ASSAY OF FERRITIN: CPT

## 2025-03-15 PROCEDURE — 83540 ASSAY OF IRON: CPT

## 2025-03-15 PROCEDURE — 83550 IRON BINDING TEST: CPT

## 2025-04-02 ENCOUNTER — RESULTS FOLLOW-UP (OUTPATIENT)
Dept: MEDICAL GROUP | Facility: PHYSICIAN GROUP | Age: 50
End: 2025-04-02

## 2025-04-17 ENCOUNTER — OFFICE VISIT (OUTPATIENT)
Dept: MEDICAL GROUP | Facility: PHYSICIAN GROUP | Age: 50
End: 2025-04-17
Payer: COMMERCIAL

## 2025-04-17 VITALS
SYSTOLIC BLOOD PRESSURE: 110 MMHG | DIASTOLIC BLOOD PRESSURE: 70 MMHG | TEMPERATURE: 96.6 F | WEIGHT: 182 LBS | HEIGHT: 63 IN | OXYGEN SATURATION: 96 % | HEART RATE: 78 BPM | BODY MASS INDEX: 32.25 KG/M2

## 2025-04-17 DIAGNOSIS — Z23 NEED FOR VACCINATION: ICD-10-CM

## 2025-04-17 DIAGNOSIS — D50.8 OTHER IRON DEFICIENCY ANEMIA: ICD-10-CM

## 2025-04-17 PROCEDURE — 99214 OFFICE O/P EST MOD 30 MIN: CPT | Mod: 25 | Performed by: NURSE PRACTITIONER

## 2025-04-17 PROCEDURE — 90677 PCV20 VACCINE IM: CPT | Performed by: NURSE PRACTITIONER

## 2025-04-17 PROCEDURE — 3078F DIAST BP <80 MM HG: CPT | Performed by: NURSE PRACTITIONER

## 2025-04-17 PROCEDURE — 3074F SYST BP LT 130 MM HG: CPT | Performed by: NURSE PRACTITIONER

## 2025-04-17 PROCEDURE — 90471 IMMUNIZATION ADMIN: CPT | Performed by: NURSE PRACTITIONER

## 2025-04-17 ASSESSMENT — PATIENT HEALTH QUESTIONNAIRE - PHQ9: CLINICAL INTERPRETATION OF PHQ2 SCORE: 0

## 2025-04-17 ASSESSMENT — FIBROSIS 4 INDEX: FIB4 SCORE: 1.85

## 2025-04-17 NOTE — PROGRESS NOTES
"Subjective:     CC: Lab results     HPI:   Alis presents today with the following:    Iron deficiency anemia  Here to follow-up on iron deficiency.  Continues ferrous sulfate 3 times a week.  No constipation or change in color of stool.  Recent labs show improved iron deficiency.  Plan to complete current prescription and then stop.  Repeat labs in next 3 to 4 months.      History reviewed. No pertinent past medical history.    Social History     Tobacco Use    Smoking status: Never    Smokeless tobacco: Never   Vaping Use    Vaping status: Never Used   Substance Use Topics    Alcohol use: No    Drug use: No       Current Outpatient Medications Ordered in Epic   Medication Sig Dispense Refill    Cholecalciferol (VITAMIN D-3 PO) Take 2,000 Units by mouth every day.      albuterol 108 (90 Base) MCG/ACT Aero Soln inhalation aerosol Inhale 2 Puffs every four hours as needed for Shortness of Breath. 8 g 1     No current [facility]-ordered facility-administered medications on file.       Allergies:  Patient has no known allergies.    Health Maintenance: Encouraged to schedule mammogram.  Completing pneumonia vaccine today.      Objective:     Vital signs reviewed  Exam:  /70 (BP Location: Left arm, Patient Position: Sitting, BP Cuff Size: Adult)   Pulse 78   Temp 35.9 °C (96.6 °F) (Temporal)   Ht 1.6 m (5' 3\")   Wt 82.6 kg (182 lb)   LMP 03/10/2025 (Approximate)   SpO2 96%   BMI 32.24 kg/m²  Body mass index is 32.24 kg/m².    Gen: Alert and oriented, No apparent distress.  Neck: Neck is supple, full ROM.  Lungs: Normal effort, no audible wheezes  CV: Skin pink, warm and dry.  Ext: No clubbing, cyanosis, edema.      Assessment & Plan:     49 y.o. female with the following -     1. Other iron deficiency anemia  Chronic stable problem.  Complete current ferrous sulfate prescription and then stop.  Repeat labs in 3-4 months.  - CBC WITHOUT DIFFERENTIAL; Future  - IRON/TOTAL IRON BIND; Future  - FERRITIN; " Future    2. Need for vaccination  Acute uncomplicated problem.  She would like her pneumonia vaccine today. I have placed the below orders and discussed them with an approved delegating provider. The MA is performing the below orders under the direction of Dr. Vega.  - Pneumococcal Conjugate Vaccine 20-Valent (6 wks+)    Return if symptoms worsen or fail to improve.    Please note that this dictation was created using voice recognition software. I have made every reasonable attempt to correct obvious errors, but I expect that there are errors of grammar and possibly content that I did not discover before finalizing the note.

## 2025-04-17 NOTE — ASSESSMENT & PLAN NOTE
Here to follow-up on iron deficiency.  Continues ferrous sulfate 3 times a week.  No constipation or change in color of stool.  Recent labs show improved iron deficiency.  Plan to complete current prescription and then stop.  Repeat labs in next 3 to 4 months.

## 2025-04-23 ENCOUNTER — OFFICE VISIT (OUTPATIENT)
Dept: MEDICAL GROUP | Facility: PHYSICIAN GROUP | Age: 50
End: 2025-04-23
Payer: COMMERCIAL

## 2025-04-23 VITALS
WEIGHT: 182.98 LBS | HEART RATE: 91 BPM | DIASTOLIC BLOOD PRESSURE: 74 MMHG | HEIGHT: 63 IN | BODY MASS INDEX: 32.42 KG/M2 | SYSTOLIC BLOOD PRESSURE: 106 MMHG | OXYGEN SATURATION: 95 % | TEMPERATURE: 97.4 F

## 2025-04-23 DIAGNOSIS — Z13.228 SCREENING FOR ENDOCRINE, METABOLIC AND IMMUNITY DISORDER: ICD-10-CM

## 2025-04-23 DIAGNOSIS — R09.82 POST-NASAL DRIP: ICD-10-CM

## 2025-04-23 DIAGNOSIS — Z13.0 SCREENING FOR ENDOCRINE, METABOLIC AND IMMUNITY DISORDER: ICD-10-CM

## 2025-04-23 DIAGNOSIS — Z00.00 PREVENTATIVE HEALTH CARE: ICD-10-CM

## 2025-04-23 DIAGNOSIS — Z13.29 SCREENING FOR ENDOCRINE, METABOLIC AND IMMUNITY DISORDER: ICD-10-CM

## 2025-04-23 DIAGNOSIS — E66.9 OBESITY (BMI 30-39.9): ICD-10-CM

## 2025-04-23 DIAGNOSIS — Z00.01 ANNUAL VISIT FOR GENERAL ADULT MEDICAL EXAMINATION WITH ABNORMAL FINDINGS: ICD-10-CM

## 2025-04-23 PROCEDURE — 99396 PREV VISIT EST AGE 40-64: CPT | Performed by: NURSE PRACTITIONER

## 2025-04-23 PROCEDURE — 3078F DIAST BP <80 MM HG: CPT | Performed by: NURSE PRACTITIONER

## 2025-04-23 PROCEDURE — 3074F SYST BP LT 130 MM HG: CPT | Performed by: NURSE PRACTITIONER

## 2025-04-23 ASSESSMENT — FIBROSIS 4 INDEX: FIB4 SCORE: 1.85

## 2025-04-23 NOTE — PROGRESS NOTES
Subjective:     CC:   Chief Complaint   Patient presents with    Annual Exam       HPI:   Alis Becker is a 49 y.o. female who presents for annual exam. She is feeling well and denies any complaints.    Ob-Gyn/ History:    Patient has GYN provider: no  /Para:  5/4  Last Pap Smear:  2023. No history of abnormal pap smears.  Gyn Surgery:  .  Current Contraceptive Method:  IUD. She is currently sexually active.  Last menstrual period:  2025.  Periods regular. light, heavy bleeding. Cramping is moderate.   She does not take OTC analgesics for cramps.  No significant bloating/fluid retention, pelvic pain, or dyspareunia. No vaginal discharge  Urinary incontinence: no  Folate intake: no     Health Maintenance  PT/vit D for falls prevention: vitamin D3 2000 units daily   Cholesterol Screening: labs ordered   Diabetes Screening: labs ordered   Diet: She is eating healthy diet. Fast food once a week. Half a soda 1-2 times a week.  Exercise: She has not been walking but plans to resume 2 miles 3 times a week.    Substance Abuse: Discussed and reviewed   Safe in relationship.   Seat belts safety discussed.  Sun protection used.  Has     Cancer screening  Colorectal Cancer Screenin2023 with Cologuard    Lung Cancer Screening: non-smoker    Cervical Cancer Screenin2023, continue Q3 years   Breast Cancer Screening: ordered, encouraged to schedule, order reprinted     Infectious disease screening/Immunizations  --HIV Screenin2024   --Hepatitis C Screenin2024   --Immunizations:    Influenza: declines    Tetanus: 10/23/2024    Shingles: encouraged to complete at the pharmacy   Pneumococcal : 2025    Other immunizations: declines COVID     She  has no past medical history of Asthma, Hyperlipidemia, or Hypertension.  She  has a past surgical history that includes primary c section.    Family History   Problem Relation Age of Onset    Diabetes Mother      Heart Disease Mother     Hypertension Mother     Arthritis Mother         lupus    Thyroid Mother     No Known Problems Father     Thyroid Sister        Social History     Socioeconomic History    Marital status:      Spouse name: Not on file    Number of children: Not on file    Years of education: Not on file    Highest education level: 12th grade   Occupational History    Not on file   Tobacco Use    Smoking status: Never    Smokeless tobacco: Never   Vaping Use    Vaping status: Never Used   Substance and Sexual Activity    Alcohol use: No    Drug use: No    Sexual activity: Yes     Partners: Male     Birth control/protection: I.U.D.     Comment:    Other Topics Concern    Not on file   Social History Narrative    Not on file     Social Drivers of Health     Financial Resource Strain: Low Risk  (8/23/2024)    Overall Financial Resource Strain (CARDIA)     Difficulty of Paying Living Expenses: Not hard at all   Food Insecurity: No Food Insecurity (8/23/2024)    Hunger Vital Sign     Worried About Running Out of Food in the Last Year: Never true     Ran Out of Food in the Last Year: Never true   Transportation Needs: No Transportation Needs (8/23/2024)    PRAPARE - Transportation     Lack of Transportation (Medical): No     Lack of Transportation (Non-Medical): No   Physical Activity: Insufficiently Active (8/23/2024)    Exercise Vital Sign     Days of Exercise per Week: 1 day     Minutes of Exercise per Session: 20 min   Stress: No Stress Concern Present (8/23/2024)    [employer] - Occupational Stress Questionnaire     Feeling of Stress : Not at all   Social Connections: Moderately Integrated (8/23/2024)    Social Connection and Isolation Panel [NHANES]     Frequency of Communication with Friends and Family: More than three times a week     Frequency of Social Gatherings with Friends and Family: More than three times a week     Attends Islam Services: More than 4  times per year     Active Member of Clubs or Organizations: No     Attends Club or Organization Meetings: Never     Marital Status:    Intimate Partner Violence: Not on file   Housing Stability: Low Risk  (8/23/2024)    Housing Stability Vital Sign     Unable to Pay for Housing in the Last Year: No     Number of Times Moved in the Last Year: 0     Homeless in the Last Year: No       Patient Active Problem List    Diagnosis Date Noted    Elevated liver enzymes 12/19/2024    History of Bell's palsy 05/05/2023    Intractable migraine without aura and with status migrainosus 05/05/2023    IUD (intrauterine device) in place 10/05/2022    Vitamin D deficiency 10/05/2022    Obesity (BMI 30-39.9) 10/05/2022    Iron deficiency anemia 12/11/2019    Positive OSWALDO (antinuclear antibody) 12/11/2019    Pain involving joints of fingers of both hands 12/02/2019    Grade I hemorrhoids 12/02/2019         Current Outpatient Medications   Medication Sig Dispense Refill    Cholecalciferol (VITAMIN D-3 PO) Take 2,000 Units by mouth every day.      albuterol 108 (90 Base) MCG/ACT Aero Soln inhalation aerosol Inhale 2 Puffs every four hours as needed for Shortness of Breath. 8 g 1     No current facility-administered medications for this visit.     No Known Allergies    Review of Systems   Constitutional: Negative for fever, chills and malaise/fatigue.   HENT: Negative for congestion.    Eyes: Negative for pain.   Respiratory: Negative for cough and shortness of breath with exertion. Positive shortness of breath intermittently when laying down.    Cardiovascular: Negative for leg swelling.   Gastrointestinal: Negative for nausea, vomiting, abdominal pain and diarrhea.   Genitourinary: Negative for dysuria and hematuria.   Skin: Negative for rash.   Neurological: Negative for dizziness and headaches.   Endo/Heme/Allergies: Does not bruise/bleed easily.   Psychiatric/Behavioral: Negative for depression.  The patient is not  "nervous/anxious.      Objective:     Vital signs reviewed   /74 (BP Location: Left arm, Patient Position: Sitting, BP Cuff Size: Adult)   Pulse 91   Temp 36.3 °C (97.4 °F) (Oral)   Ht 1.6 m (5' 3\")   Wt 83 kg (182 lb 15.7 oz)   LMP 04/22/2025 (Approximate)   SpO2 95%   BMI 32.41 kg/m²   Body mass index is 32.41 kg/m².  Wt Readings from Last 4 Encounters:   04/23/25 83 kg (182 lb 15.7 oz)   04/17/25 82.6 kg (182 lb)   12/19/24 80.5 kg (177 lb 7.5 oz)   10/23/24 77.1 kg (170 lb)       Physical Exam:  Constitutional: Well-developed and well-nourished. Not diaphoretic. No distress.   Skin: Skin is warm and dry. No rash noted.  Head: Atraumatic without lesions.  Eyes: Conjunctivae and extraocular motions are normal. Pupils are equal, round, and reactive to light. No scleral icterus. Wears glasses.  Ears:  External ears unremarkable. Tympanic membranes clear and intact.  Nose: Nares patent. Septum midline. Turbinates without erythema nor edema. No discharge.   Mouth/Throat: Dentition is intact. Tongue normal. Oropharynx is moist. Posterior pharynx with cobblestoning no exudates.  Neck: Supple, trachea midline. Normal range of motion. No thyromegaly present. No lymphadenopathy--cervical or supraclavicular.  Cardiovascular: Regular rate and rhythm, S1 and S2 without murmur, rubs, or gallops.  Lungs: Normal inspiratory effort, CTA bilaterally, no wheezes/rhonchi/rales  Abdomen: Soft, non tender, and without distention. Active bowel sounds in all four quadrants. No rebound, guarding, masses or HSM.  Extremities: No cyanosis, clubbing, erythema, nor edema. Distal pulses intact and symmetric.   Musculoskeletal: All major joints AROM full in all directions without pain.  Neurological: Alert and oriented x 3.  Psychiatric:  Behavior, mood, and affect are appropriate.      Assessment and Plan:     1. Annual visit for general adult medical examination with abnormal findings  Acute uncomplicated problem.  Annual exam " completed today. Discussion today about general wellness and lifestyle habits:  Engage in regular physical and social activities  Skin care, including sunscreen  Recommended annual eye exams and annual dental exams  Discussed wearing seatbelt when in car at all times    2. Post-nasal drip  Acute uncomplicated problem.  Recommend she use Flonase nasal spray.  Continue with daily allergy medication.    3. Obesity (BMI 30-39.9)  Chronic exacerbated problem.  Encouraged healthy diet and exercise.    - Patient identified as having weight management issue.  Appropriate orders and counseling given.    4. Preventative health care  Acute uncomplicated problem.  Due for annual labs around November 2025.  - CBC WITH DIFFERENTIAL; Future  - Comp Metabolic Panel; Future  - Lipid Profile; Future    5. Screening for endocrine, metabolic and immunity disorder  Acute uncomplicated problem.  Due for annual screening around November 2025.  - TSH WITH REFLEX TO FT4; Future  - VITAMIN D,25 HYDROXY (DEFICIENCY); Future    HCM:  encouraged to complete mammogram and shingles vaccine at the pharmacy   Labs per orders  Immunizations per orders  Patient counseled about skin care, diet, supplements, prenatal vitamins, safe sex and exercise.    Follow-up: Return in about 1 year (around 4/23/2026) for annual with pap.      Please note that this dictation was created using voice recognition software. I have made every reasonable attempt to correct obvious errors, but I expect that there are errors of grammar and possibly content that I did not discover before finalizing the note.